# Patient Record
Sex: MALE | Race: WHITE | NOT HISPANIC OR LATINO
[De-identification: names, ages, dates, MRNs, and addresses within clinical notes are randomized per-mention and may not be internally consistent; named-entity substitution may affect disease eponyms.]

---

## 2017-01-23 ENCOUNTER — MESSAGE (OUTPATIENT)
Age: 37
End: 2017-01-23

## 2017-02-24 ENCOUNTER — MEDICATION RENEWAL (OUTPATIENT)
Age: 37
End: 2017-02-24

## 2017-03-21 ENCOUNTER — MEDICATION RENEWAL (OUTPATIENT)
Age: 37
End: 2017-03-21

## 2017-06-19 ENCOUNTER — MEDICATION RENEWAL (OUTPATIENT)
Age: 37
End: 2017-06-19

## 2017-07-20 ENCOUNTER — MEDICATION RENEWAL (OUTPATIENT)
Age: 37
End: 2017-07-20

## 2017-08-18 ENCOUNTER — RX RENEWAL (OUTPATIENT)
Age: 37
End: 2017-08-18

## 2017-08-24 ENCOUNTER — APPOINTMENT (OUTPATIENT)
Dept: HUMAN REPRODUCTION | Facility: CLINIC | Age: 37
End: 2017-08-24
Payer: COMMERCIAL

## 2017-08-24 PROCEDURE — 89322 SEMEN ANAL STRICT CRITERIA: CPT

## 2017-10-02 ENCOUNTER — MEDICATION RENEWAL (OUTPATIENT)
Age: 37
End: 2017-10-02

## 2017-11-02 ENCOUNTER — MEDICATION RENEWAL (OUTPATIENT)
Age: 37
End: 2017-11-02

## 2017-11-30 ENCOUNTER — MEDICATION RENEWAL (OUTPATIENT)
Age: 37
End: 2017-11-30

## 2018-01-02 ENCOUNTER — MEDICATION RENEWAL (OUTPATIENT)
Age: 38
End: 2018-01-02

## 2018-01-31 ENCOUNTER — MEDICATION RENEWAL (OUTPATIENT)
Age: 38
End: 2018-01-31

## 2018-03-01 ENCOUNTER — MEDICATION RENEWAL (OUTPATIENT)
Age: 38
End: 2018-03-01

## 2018-03-28 ENCOUNTER — MEDICATION RENEWAL (OUTPATIENT)
Age: 38
End: 2018-03-28

## 2018-03-29 ENCOUNTER — MEDICATION RENEWAL (OUTPATIENT)
Age: 38
End: 2018-03-29

## 2018-04-30 ENCOUNTER — MEDICATION RENEWAL (OUTPATIENT)
Age: 38
End: 2018-04-30

## 2018-05-14 ENCOUNTER — APPOINTMENT (OUTPATIENT)
Dept: INTERNAL MEDICINE | Facility: CLINIC | Age: 38
End: 2018-05-14
Payer: COMMERCIAL

## 2018-05-14 VITALS — WEIGHT: 231 LBS | BODY MASS INDEX: 30.62 KG/M2 | HEIGHT: 73 IN

## 2018-05-14 DIAGNOSIS — Z87.09 PERSONAL HISTORY OF OTHER DISEASES OF THE RESPIRATORY SYSTEM: ICD-10-CM

## 2018-05-14 LAB
BILIRUB UR QL STRIP: NORMAL
CLARITY UR: CLEAR
COLLECTION METHOD: NORMAL
GLUCOSE UR-MCNC: NORMAL
HCG UR QL: 0.2 EU/DL
HGB UR QL STRIP.AUTO: NORMAL
KETONES UR-MCNC: NORMAL
LEUKOCYTE ESTERASE UR QL STRIP: NORMAL
NITRITE UR QL STRIP: NORMAL
PH UR STRIP: 6
PROT UR STRIP-MCNC: NORMAL
SP GR UR STRIP: >=1.03

## 2018-05-14 PROCEDURE — 94010 BREATHING CAPACITY TEST: CPT

## 2018-05-14 PROCEDURE — 81003 URINALYSIS AUTO W/O SCOPE: CPT | Mod: QW

## 2018-05-14 RX ORDER — OSELTAMIVIR PHOSPHATE 75 MG/1
75 CAPSULE ORAL
Qty: 10 | Refills: 0 | Status: DISCONTINUED | COMMUNITY
Start: 2018-01-21

## 2018-05-14 RX ORDER — AMOXICILLIN 250 MG/1
250 CAPSULE ORAL
Qty: 21 | Refills: 0 | Status: DISCONTINUED | COMMUNITY
Start: 2018-04-05

## 2018-05-14 RX ORDER — CHLORHEXIDINE GLUCONATE, 0.12% ORAL RINSE 1.2 MG/ML
0.12 SOLUTION DENTAL
Qty: 473 | Refills: 0 | Status: DISCONTINUED | COMMUNITY
Start: 2018-04-05

## 2018-05-31 ENCOUNTER — MEDICATION RENEWAL (OUTPATIENT)
Age: 38
End: 2018-05-31

## 2018-06-08 ENCOUNTER — EMERGENCY (EMERGENCY)
Facility: HOSPITAL | Age: 38
LOS: 1 days | Discharge: HOME | End: 2018-06-08
Attending: EMERGENCY MEDICINE | Admitting: EMERGENCY MEDICINE
Payer: COMMERCIAL

## 2018-06-08 VITALS
SYSTOLIC BLOOD PRESSURE: 113 MMHG | HEART RATE: 66 BPM | OXYGEN SATURATION: 100 % | RESPIRATION RATE: 16 BRPM | DIASTOLIC BLOOD PRESSURE: 70 MMHG

## 2018-06-08 VITALS
DIASTOLIC BLOOD PRESSURE: 68 MMHG | OXYGEN SATURATION: 98 % | TEMPERATURE: 98 F | SYSTOLIC BLOOD PRESSURE: 96 MMHG | RESPIRATION RATE: 20 BRPM | HEART RATE: 68 BPM

## 2018-06-08 LAB
ALBUMIN SERPL ELPH-MCNC: 4.1 G/DL — SIGNIFICANT CHANGE UP (ref 3.3–5)
ALP SERPL-CCNC: 76 U/L — SIGNIFICANT CHANGE UP (ref 40–120)
ALT FLD-CCNC: 21 U/L — SIGNIFICANT CHANGE UP (ref 4–41)
AST SERPL-CCNC: 15 U/L — SIGNIFICANT CHANGE UP (ref 4–40)
BASOPHILS # BLD AUTO: 0.03 K/UL — SIGNIFICANT CHANGE UP (ref 0–0.2)
BASOPHILS NFR BLD AUTO: 0.3 % — SIGNIFICANT CHANGE UP (ref 0–2)
BILIRUB SERPL-MCNC: 0.2 MG/DL — SIGNIFICANT CHANGE UP (ref 0.2–1.2)
BUN SERPL-MCNC: 9 MG/DL — SIGNIFICANT CHANGE UP (ref 7–23)
CALCIUM SERPL-MCNC: 8.6 MG/DL — SIGNIFICANT CHANGE UP (ref 8.4–10.5)
CHLORIDE SERPL-SCNC: 103 MMOL/L — SIGNIFICANT CHANGE UP (ref 98–107)
CO2 SERPL-SCNC: 25 MMOL/L — SIGNIFICANT CHANGE UP (ref 22–31)
CREAT SERPL-MCNC: 0.96 MG/DL — SIGNIFICANT CHANGE UP (ref 0.5–1.3)
D DIMER BLD IA.RAPID-MCNC: < 150 NG/ML — SIGNIFICANT CHANGE UP
EOSINOPHIL # BLD AUTO: 0.18 K/UL — SIGNIFICANT CHANGE UP (ref 0–0.5)
EOSINOPHIL NFR BLD AUTO: 1.8 % — SIGNIFICANT CHANGE UP (ref 0–6)
GLUCOSE SERPL-MCNC: 106 MG/DL — HIGH (ref 70–99)
HCT VFR BLD CALC: 40.3 % — SIGNIFICANT CHANGE UP (ref 39–50)
HGB BLD-MCNC: 13.9 G/DL — SIGNIFICANT CHANGE UP (ref 13–17)
IMM GRANULOCYTES # BLD AUTO: 0.03 # — SIGNIFICANT CHANGE UP
IMM GRANULOCYTES NFR BLD AUTO: 0.3 % — SIGNIFICANT CHANGE UP (ref 0–1.5)
LYMPHOCYTES # BLD AUTO: 1.94 K/UL — SIGNIFICANT CHANGE UP (ref 1–3.3)
LYMPHOCYTES # BLD AUTO: 18.9 % — SIGNIFICANT CHANGE UP (ref 13–44)
MCHC RBC-ENTMCNC: 28.2 PG — SIGNIFICANT CHANGE UP (ref 27–34)
MCHC RBC-ENTMCNC: 34.5 % — SIGNIFICANT CHANGE UP (ref 32–36)
MCV RBC AUTO: 81.7 FL — SIGNIFICANT CHANGE UP (ref 80–100)
MONOCYTES # BLD AUTO: 0.62 K/UL — SIGNIFICANT CHANGE UP (ref 0–0.9)
MONOCYTES NFR BLD AUTO: 6 % — SIGNIFICANT CHANGE UP (ref 2–14)
NEUTROPHILS # BLD AUTO: 7.46 K/UL — HIGH (ref 1.8–7.4)
NEUTROPHILS NFR BLD AUTO: 72.7 % — SIGNIFICANT CHANGE UP (ref 43–77)
NRBC # FLD: 0 — SIGNIFICANT CHANGE UP
PLATELET # BLD AUTO: 251 K/UL — SIGNIFICANT CHANGE UP (ref 150–400)
PMV BLD: 10.2 FL — SIGNIFICANT CHANGE UP (ref 7–13)
POTASSIUM SERPL-MCNC: 3.8 MMOL/L — SIGNIFICANT CHANGE UP (ref 3.5–5.3)
POTASSIUM SERPL-SCNC: 3.8 MMOL/L — SIGNIFICANT CHANGE UP (ref 3.5–5.3)
PROT SERPL-MCNC: 6.7 G/DL — SIGNIFICANT CHANGE UP (ref 6–8.3)
RBC # BLD: 4.93 M/UL — SIGNIFICANT CHANGE UP (ref 4.2–5.8)
RBC # FLD: 12.8 % — SIGNIFICANT CHANGE UP (ref 10.3–14.5)
SODIUM SERPL-SCNC: 141 MMOL/L — SIGNIFICANT CHANGE UP (ref 135–145)
WBC # BLD: 10.26 K/UL — SIGNIFICANT CHANGE UP (ref 3.8–10.5)
WBC # FLD AUTO: 10.26 K/UL — SIGNIFICANT CHANGE UP (ref 3.8–10.5)

## 2018-06-08 PROCEDURE — 71046 X-RAY EXAM CHEST 2 VIEWS: CPT | Mod: 26

## 2018-06-08 PROCEDURE — 93010 ELECTROCARDIOGRAM REPORT: CPT

## 2018-06-08 PROCEDURE — 99284 EMERGENCY DEPT VISIT MOD MDM: CPT | Mod: 25

## 2018-06-08 NOTE — ED ADULT TRIAGE NOTE - CHIEF COMPLAINT QUOTE
pt woke up with right sided chest pain radiating to shoulder and back which lasted for about an hour. was feeling nauseous.  Pt says he had abdominal pain earlier. Denies any pain or nausea now.

## 2018-06-08 NOTE — ED PROVIDER NOTE - MEDICAL DECISION MAKING DETAILS
see attg note 38M PMH ADHD on ritalin p/w RUQ pain that radiated to R chest and RUE, lasted 45min, minimal nausea, no other associated symptoms, now completely resolved and asymptomatic. Slightly hypotensive (though no lightheaded or other cardiovascular symptoms, unknown baseline BP). Other vitals wnl. Exam as above. EKG wnl.  ddx: Possible gallstone vs. GERD/gastritis/PUD vs. MSK. clinically not ACS, tamponade, dissection, PTX, perf, myocarditis, mediastinitis.   Pt's father is MD.   Extensive discussion w/ pt and father. Though very low suspicion for PE and currently asymptomatic, will check d-dimer, CBC, cmp, XR. Reassess.  offered RUQ US, pt/family declined (clinically not cholecystitis).

## 2018-06-08 NOTE — ED PROVIDER NOTE - OBJECTIVE STATEMENT
38M PMH ADHD on ritalin p/w abd pain/CP. Awoke in middle of night to use bathroom, then developed RLQ pain, pain then migrated to RUQ w/ radiation to R chest and shoulder. Minimal nausea. Lasted ~45min then resolved and now completely asymptomatic. Felt slight SOB during episode. Not similar to prior. Denies VD, lightheaded, diaphoresis, palpitations, cough/rhinorrhea, black/bloody stool, LE pain/swelling, focal weakness/numbness, recent travel/immobilization, abd pain, urinary complaints, f/c.  No FMH CAD/clots. No prior cardiac w/u. 38M PMH ADHD on ritalin p/w abd pain/CP. Awoke in middle of night to use bathroom, then developed RLQ pain, pain then migrated to RUQ w/ radiation to R chest and shoulder. Minimal nausea. Lasted ~45min then resolved and now completely asymptomatic. Felt slight SOB during episode. Not similar to prior. Denies VD, lightheaded, diaphoresis, palpitations, cough/rhinorrhea, black/bloody stool, LE pain/swelling, focal weakness/numbness, recent travel/immobilization, abd pain, urinary complaints, f/c.  Pt adopted, unknown FMH. No prior cardiac w/u.

## 2018-06-08 NOTE — ED PROVIDER NOTE - PROGRESS NOTE DETAILS
[place]: Remains asymptomatic, labs/xr grossly wnl, given copy of results, comfortable for dc, outpt pmd f/u.

## 2018-06-08 NOTE — ED ADULT NURSE NOTE - OBJECTIVE STATEMENT
Pt received to rm 15 with family at bedside, Calm pleasant Jovial affect Pt st" Around 3:30am I got up to use the bathroom and had a sharp pain in rt mid back area that I never felt before....and I felt like I couldn't take deep breath....lasted apprx 20 minutes....now it is completely gone." Denies nausea, Denies urinary symptoms. No Med hx. EKG done, pt place on cm, MD Roscoe hall at beside  and labs sent. Pt positoned for comfort. xray pending.

## 2018-06-08 NOTE — ED PROVIDER NOTE - ATTENDING CONTRIBUTION TO CARE
38M PMH ADHD on ritalin p/w RUQ pain that radiated to R chest and RUE, lasted 45min, minimal nausea, no other associated symptoms, now completely resolved and asymptomatic. Slightly hypotensive (though no lightheaded or other cardiovascular symptoms, unknown baseline BP). Other vitals wnl. Exam as above. EKG wnl.  ddx: Possible gallstone vs. GERD/gastritis/PUD vs. MSK. clinically not ACS, tamponade, dissection, PTX, perf, myocarditis, mediastinitis.   Pt's father is MD.   Extensive discussion w/ pt and father. Though very low suspicion for PE and currently asymptomatic, will check d-dimer, CBC, cmp, XR. Reassess. 38M PMH ADHD on ritalin p/w RUQ pain that radiated to R chest and RUE, lasted 45min, minimal nausea, no other associated symptoms, now completely resolved and asymptomatic. Slightly hypotensive (though no lightheaded or other cardiovascular symptoms, unknown baseline BP). Other vitals wnl. Exam as above. EKG wnl.  ddx: Possible gallstone vs. GERD/gastritis/PUD vs. MSK. clinically not ACS, tamponade, dissection, PTX, perf, myocarditis, mediastinitis.   Pt's father is MD.   Extensive discussion w/ pt and father. Though very low suspicion for PE and currently asymptomatic, will check d-dimer, CBC, cmp, XR. Reassess.  offered RUQ US, pt/family declined (clinically not cholecystitis).

## 2018-06-29 ENCOUNTER — MEDICATION RENEWAL (OUTPATIENT)
Age: 38
End: 2018-06-29

## 2018-07-03 ENCOUNTER — APPOINTMENT (OUTPATIENT)
Dept: INFECTIOUS DISEASE | Facility: CLINIC | Age: 38
End: 2018-07-03

## 2018-07-15 ENCOUNTER — APPOINTMENT (OUTPATIENT)
Dept: HUMAN REPRODUCTION | Facility: CLINIC | Age: 38
End: 2018-07-15
Payer: COMMERCIAL

## 2018-07-15 PROCEDURE — 89343 STORAGE/YEAR SPERM/SEMEN: CPT

## 2018-07-15 PROCEDURE — 89259 CRYOPRESERVATION SPERM: CPT

## 2018-07-16 ENCOUNTER — APPOINTMENT (OUTPATIENT)
Dept: INTERNAL MEDICINE | Facility: CLINIC | Age: 38
End: 2018-07-16
Payer: COMMERCIAL

## 2018-07-16 PROCEDURE — 90715 TDAP VACCINE 7 YRS/> IM: CPT

## 2018-07-16 PROCEDURE — 90471 IMMUNIZATION ADMIN: CPT

## 2018-07-19 ENCOUNTER — RX RENEWAL (OUTPATIENT)
Age: 38
End: 2018-07-19

## 2018-07-27 ENCOUNTER — MEDICATION RENEWAL (OUTPATIENT)
Age: 38
End: 2018-07-27

## 2018-07-31 DIAGNOSIS — T78.40XA ALLERGY, UNSPECIFIED, INITIAL ENCOUNTER: ICD-10-CM

## 2018-09-24 ENCOUNTER — MEDICATION RENEWAL (OUTPATIENT)
Age: 38
End: 2018-09-24

## 2018-10-22 ENCOUNTER — MEDICATION RENEWAL (OUTPATIENT)
Age: 38
End: 2018-10-22

## 2018-11-19 ENCOUNTER — MEDICATION RENEWAL (OUTPATIENT)
Age: 38
End: 2018-11-19

## 2018-12-18 ENCOUNTER — MEDICATION RENEWAL (OUTPATIENT)
Age: 38
End: 2018-12-18

## 2018-12-31 ENCOUNTER — APPOINTMENT (OUTPATIENT)
Dept: INTERNAL MEDICINE | Facility: CLINIC | Age: 38
End: 2018-12-31
Payer: COMMERCIAL

## 2018-12-31 PROCEDURE — 90674 CCIIV4 VAC NO PRSV 0.5 ML IM: CPT

## 2018-12-31 PROCEDURE — G0008: CPT

## 2019-01-03 ENCOUNTER — LABORATORY RESULT (OUTPATIENT)
Age: 39
End: 2019-01-03

## 2019-01-04 ENCOUNTER — APPOINTMENT (OUTPATIENT)
Dept: INTERNAL MEDICINE | Facility: CLINIC | Age: 39
End: 2019-01-04
Payer: COMMERCIAL

## 2019-01-04 ENCOUNTER — NON-APPOINTMENT (OUTPATIENT)
Age: 39
End: 2019-01-04

## 2019-01-04 VITALS — DIASTOLIC BLOOD PRESSURE: 70 MMHG | SYSTOLIC BLOOD PRESSURE: 120 MMHG

## 2019-01-04 VITALS — BODY MASS INDEX: 31.28 KG/M2 | HEIGHT: 73 IN | WEIGHT: 236 LBS

## 2019-01-04 DIAGNOSIS — Z00.00 ENCOUNTER FOR GENERAL ADULT MEDICAL EXAMINATION W/OUT ABNORMAL FINDINGS: ICD-10-CM

## 2019-01-04 LAB
BILIRUB UR QL STRIP: NORMAL
CLARITY UR: CLEAR
COLLECTION METHOD: NORMAL
GLUCOSE UR-MCNC: NORMAL
HCG UR QL: 0.2 EU/DL
HGB UR QL STRIP.AUTO: NORMAL
KETONES UR-MCNC: NORMAL
LEUKOCYTE ESTERASE UR QL STRIP: NORMAL
NITRITE UR QL STRIP: NORMAL
PH UR STRIP: 7
PROT UR STRIP-MCNC: NORMAL
SP GR UR STRIP: 1.02

## 2019-01-04 PROCEDURE — 81003 URINALYSIS AUTO W/O SCOPE: CPT | Mod: QW

## 2019-01-04 PROCEDURE — 93000 ELECTROCARDIOGRAM COMPLETE: CPT

## 2019-01-04 PROCEDURE — 36415 COLL VENOUS BLD VENIPUNCTURE: CPT

## 2019-01-04 PROCEDURE — 99395 PREV VISIT EST AGE 18-39: CPT | Mod: 25

## 2019-01-04 RX ORDER — AZITHROMYCIN 250 MG/1
250 TABLET, FILM COATED ORAL
Qty: 1 | Refills: 0 | Status: DISCONTINUED | COMMUNITY
Start: 2018-05-14 | End: 2019-01-04

## 2019-01-04 NOTE — PHYSICAL EXAM
[Normal Sclera/Conjunctiva] : normal sclera/conjunctiva [PERRL] : pupils equal round and reactive to light [Normal Outer Ear/Nose] : the outer ears and nose were normal in appearance [Normal Oropharynx] : the oropharynx was normal [Normal TMs] : both tympanic membranes were normal [No JVD] : no jugular venous distention [Supple] : supple [No Lymphadenopathy] : no lymphadenopathy [No Respiratory Distress] : no respiratory distress  [Clear to Auscultation] : lungs were clear to auscultation bilaterally [Normal Rate] : normal rate  [Regular Rhythm] : with a regular rhythm [Normal S1, S2] : normal S1 and S2 [No Carotid Bruits] : no carotid bruits [Pedal Pulses Present] : the pedal pulses are present [Soft] : abdomen soft [No HSM] : no HSM [Declined Rectal Exam] : declined rectal exam [No Skin Lesions] : no skin lesions [Normal Mood] : the mood was normal

## 2019-01-04 NOTE — HEALTH RISK ASSESSMENT
[No falls in past year] : Patient reported no falls in the past year [0] : 2) Feeling down, depressed, or hopeless: Not at all (0) [Fully functional (bathing, dressing, toileting, transferring, walking, feeding)] : Fully functional (bathing, dressing, toileting, transferring, walking, feeding) [Fully functional (using the telephone, shopping, preparing meals, housekeeping, doing laundry, using] : Fully functional and needs no help or supervision to perform IADLs (using the telephone, shopping, preparing meals, housekeeping, doing laundry, using transportation, managing medications and managing finances) [Patient declined discussion] : Patient declined discussion [] : No [[id number]] : 0

## 2019-01-04 NOTE — ASSESSMENT
[FreeTextEntry1] : The patient with oral signs were stable. His complete physical examination was normal except for being overweight. His EKG was normal. I placed the patient on a low carb diet and we spoke about importance of daily exercise. I advised the patient to return in 3 months to assess his progress

## 2019-01-04 NOTE — HISTORY OF PRESENT ILLNESS
[de-identified] : This is a 39-year-old gentleman who is here today for his annual examination. He has a history of ADD for which she's on medications. And occasionally has seasonal allergies. He denies any complaints except that he's gained significant amount of weight over the past 2 years.

## 2019-01-05 ENCOUNTER — CLINICAL ADVICE (OUTPATIENT)
Age: 39
End: 2019-01-05

## 2019-01-05 LAB
25(OH)D3 SERPL-MCNC: 17.2 NG/ML
ALBUMIN SERPL ELPH-MCNC: 4.6 G/DL
ALP BLD-CCNC: 90 U/L
ALT SERPL-CCNC: 34 U/L
ANION GAP SERPL CALC-SCNC: 14 MMOL/L
AST SERPL-CCNC: 22 U/L
BASOPHILS # BLD AUTO: 0.02 K/UL
BASOPHILS NFR BLD AUTO: 0.3 %
BILIRUB SERPL-MCNC: 0.2 MG/DL
BUN SERPL-MCNC: 18 MG/DL
CALCIUM SERPL-MCNC: 10 MG/DL
CHLORIDE SERPL-SCNC: 104 MMOL/L
CHOLEST SERPL-MCNC: 216 MG/DL
CHOLEST/HDLC SERPL: 4.9 RATIO
CO2 SERPL-SCNC: 26 MMOL/L
CREAT SERPL-MCNC: 0.98 MG/DL
EOSINOPHIL # BLD AUTO: 0.14 K/UL
EOSINOPHIL NFR BLD AUTO: 1.9 %
FERRITIN SERPL-MCNC: 219 NG/ML
GLUCOSE SERPL-MCNC: 86 MG/DL
HBA1C MFR BLD HPLC: 5.7 %
HCT VFR BLD CALC: 45.7 %
HDLC SERPL-MCNC: 44 MG/DL
HGB BLD-MCNC: 14.8 G/DL
IMM GRANULOCYTES NFR BLD AUTO: 0.1 %
LDLC SERPL CALC-MCNC: 123 MG/DL
LYMPHOCYTES # BLD AUTO: 2.36 K/UL
LYMPHOCYTES NFR BLD AUTO: 31.8 %
MAN DIFF?: NORMAL
MCHC RBC-ENTMCNC: 28.8 PG
MCHC RBC-ENTMCNC: 32.4 GM/DL
MCV RBC AUTO: 88.9 FL
MONOCYTES # BLD AUTO: 0.66 K/UL
MONOCYTES NFR BLD AUTO: 8.9 %
NEUTROPHILS # BLD AUTO: 4.22 K/UL
NEUTROPHILS NFR BLD AUTO: 57 %
PLATELET # BLD AUTO: 269 K/UL
POTASSIUM SERPL-SCNC: 4.3 MMOL/L
PROT SERPL-MCNC: 7.7 G/DL
RBC # BLD: 5.14 M/UL
RBC # FLD: 13.6 %
SAVE SPECIMEN: NORMAL
SODIUM SERPL-SCNC: 144 MMOL/L
T3RU NFR SERPL: 1.07 INDEX
T4 SERPL-MCNC: 6.1 UG/DL
TRIGL SERPL-MCNC: 247 MG/DL
TSH SERPL-ACNC: 1.92 UIU/ML
URATE SERPL-MCNC: 5.9 MG/DL
VIT B12 SERPL-MCNC: 400 PG/ML
WBC # FLD AUTO: 7.41 K/UL

## 2019-02-03 PROBLEM — T78.40XA ALLERGY: Status: ACTIVE | Noted: 2018-08-06

## 2019-02-12 ENCOUNTER — MEDICATION RENEWAL (OUTPATIENT)
Age: 39
End: 2019-02-12

## 2019-03-12 ENCOUNTER — MEDICATION RENEWAL (OUTPATIENT)
Age: 39
End: 2019-03-12

## 2019-04-11 ENCOUNTER — MEDICATION RENEWAL (OUTPATIENT)
Age: 39
End: 2019-04-11

## 2019-05-20 ENCOUNTER — MEDICATION RENEWAL (OUTPATIENT)
Age: 39
End: 2019-05-20

## 2019-06-18 ENCOUNTER — MEDICATION RENEWAL (OUTPATIENT)
Age: 39
End: 2019-06-18

## 2019-07-23 ENCOUNTER — MEDICATION RENEWAL (OUTPATIENT)
Age: 39
End: 2019-07-23

## 2019-09-04 ENCOUNTER — MEDICATION RENEWAL (OUTPATIENT)
Age: 39
End: 2019-09-04

## 2019-10-07 ENCOUNTER — MEDICATION RENEWAL (OUTPATIENT)
Age: 39
End: 2019-10-07

## 2019-10-28 ENCOUNTER — APPOINTMENT (OUTPATIENT)
Dept: INTERNAL MEDICINE | Facility: CLINIC | Age: 39
End: 2019-10-28
Payer: COMMERCIAL

## 2019-10-28 VITALS — SYSTOLIC BLOOD PRESSURE: 120 MMHG | DIASTOLIC BLOOD PRESSURE: 70 MMHG

## 2019-10-28 VITALS — TEMPERATURE: 98.1 F

## 2019-10-28 DIAGNOSIS — Z87.09 PERSONAL HISTORY OF OTHER DISEASES OF THE RESPIRATORY SYSTEM: ICD-10-CM

## 2019-10-28 LAB — S PYO AG SPEC QL IA: NEGATIVE

## 2019-10-28 PROCEDURE — 87880 STREP A ASSAY W/OPTIC: CPT | Mod: QW

## 2019-10-28 PROCEDURE — 99213 OFFICE O/P EST LOW 20 MIN: CPT | Mod: 25

## 2019-10-28 NOTE — PHYSICAL EXAM
[Normal] : normal rate, regular rhythm, normal S1 and S2 and no murmur heard [de-identified] : Pharyngitis

## 2019-10-28 NOTE — HISTORY OF PRESENT ILLNESS
[de-identified] : This is a 39-year-old gentleman complaining of a sore throat with low-grade fever. He denies any other complaints

## 2019-10-28 NOTE — ASSESSMENT
[FreeTextEntry1] : The patient's physical examination is positive for a mild pharyngitis. History of screen was negative. I treated him symptomatically

## 2019-11-04 ENCOUNTER — MEDICATION RENEWAL (OUTPATIENT)
Age: 39
End: 2019-11-04

## 2019-12-05 ENCOUNTER — MEDICATION RENEWAL (OUTPATIENT)
Age: 39
End: 2019-12-05

## 2020-05-12 ENCOUNTER — APPOINTMENT (OUTPATIENT)
Dept: INTERNAL MEDICINE | Facility: CLINIC | Age: 40
End: 2020-05-12
Payer: COMMERCIAL

## 2020-05-12 PROCEDURE — 99213 OFFICE O/P EST LOW 20 MIN: CPT | Mod: 95

## 2020-05-12 NOTE — ASSESSMENT
[FreeTextEntry1] : The patient clearly has a phone call by the umbilicus. It has not come to a head yet and does not have to be incised and drained. I started the patient on doxycycline 100 mg b.i.d. for 10 days and also started him on Bactroban cream to be applied twice a day. I also enforced to the patient that he has to wash his hands many times to her today and must avoid touching his follicle. I advised him as her risk of contagiousness and advised to use separate towels in separate soaps. I also told him to avoid contact with his previous that he has and also that I want to see it again in a few days because sure that this is going to have to be incised and drained

## 2020-05-12 NOTE — HISTORY OF PRESENT ILLNESS
[Home] : at home, [unfilled] , at the time of the visit. [de-identified] : This is a 32-year-old gentleman with a history of asthma who is complaining of a lump by his umbilicus. He complains of itchy. He denies any fever chills or any other rashes [Medical Office: ([facility])___] : at the medical office located in

## 2020-05-14 ENCOUNTER — APPOINTMENT (OUTPATIENT)
Dept: INTERNAL MEDICINE | Facility: CLINIC | Age: 40
End: 2020-05-14
Payer: COMMERCIAL

## 2020-05-14 DIAGNOSIS — F98.8 OTHER SPECIFIED BEHAVIORAL AND EMOTIONAL DISORDERS WITH ONSET USUALLY OCCURRING IN CHILDHOOD AND ADOLESCENCE: ICD-10-CM

## 2020-05-14 PROCEDURE — 99213 OFFICE O/P EST LOW 20 MIN: CPT | Mod: 95

## 2020-05-14 NOTE — ASSESSMENT
[FreeTextEntry1] : On examination of the furuncle of did open and there is no pus present is a mild erythema at the site. I advised him to continue his antibiotics and Bactroban. And I had a long talk with him about avoiding scratching his abdomen keeping his hands away from his face and nose. And to make sure that he scrupulously washes his hands and sensitizers him as he has to 's at home

## 2020-05-14 NOTE — HISTORY OF PRESENT ILLNESS
[de-identified] : This is a patient with a history of ADHD who developed a furuncle on his abdomen. I treated him with doxycycline 100 mg b.i.d. and also with Bactroban cream t.i.d. The patient is here for a followup visit but he claims that the furuncle opened and drained spontaneously and occasionally better

## 2020-05-15 LAB
SARS-COV-2 IGG SERPL IA-ACNC: <0.1 INDEX
SARS-COV-2 IGG SERPL QL IA: NEGATIVE

## 2020-08-10 RX ORDER — DOXYCYCLINE HYCLATE 100 MG/1
100 CAPSULE ORAL
Qty: 20 | Refills: 0 | Status: DISCONTINUED | COMMUNITY
Start: 2020-05-12 | End: 2020-08-10

## 2020-09-04 ENCOUNTER — TRANSCRIPTION ENCOUNTER (OUTPATIENT)
Age: 40
End: 2020-09-04

## 2020-09-24 DIAGNOSIS — L03.019 CELLULITIS OF UNSPECIFIED FINGER: ICD-10-CM

## 2020-11-11 ENCOUNTER — APPOINTMENT (OUTPATIENT)
Dept: HUMAN REPRODUCTION | Facility: CLINIC | Age: 40
End: 2020-11-11

## 2020-12-21 PROBLEM — Z87.09 HISTORY OF ACUTE PHARYNGITIS: Status: RESOLVED | Noted: 2019-10-28 | Resolved: 2020-12-21

## 2021-03-22 ENCOUNTER — APPOINTMENT (OUTPATIENT)
Dept: PULMONOLOGY | Facility: CLINIC | Age: 41
End: 2021-03-22
Payer: COMMERCIAL

## 2021-03-22 ENCOUNTER — APPOINTMENT (OUTPATIENT)
Dept: INTERNAL MEDICINE | Facility: CLINIC | Age: 41
End: 2021-03-22
Payer: COMMERCIAL

## 2021-03-22 VITALS — HEIGHT: 72 IN | WEIGHT: 250 LBS | HEART RATE: 96 BPM | BODY MASS INDEX: 33.86 KG/M2 | TEMPERATURE: 97.6 F

## 2021-03-22 PROCEDURE — 99443: CPT

## 2021-03-22 PROCEDURE — 99203 OFFICE O/P NEW LOW 30 MIN: CPT | Mod: 95

## 2021-03-22 RX ORDER — CEPHALEXIN 500 MG/1
500 CAPSULE ORAL
Qty: 28 | Refills: 1 | Status: DISCONTINUED | COMMUNITY
Start: 2020-09-24 | End: 2021-03-22

## 2021-03-22 NOTE — HISTORY OF PRESENT ILLNESS
[Home] : at home, [unfilled] , at the time of the visit. [Medical Office: ([facility])___] : at the medical office located in  [Verbal consent obtained from patient] : the patient, [unfilled] [FreeTextEntry1] : 41-year-old male, initial telehealth visit, referred to the [facility] program for Covid.\par \par Patient received his second dose of the Moderna vaccine on 17th.  On that same day he was exposed to Covid.  His wife (who is 1 month post her second dose of vaccine) and younger children age 1 and 2, were exposed as well.\par \par On March 20 he developed mild symptoms of nasal congestion.  In fact, this is similar to his typical allergy symptoms this time of year.  Because of the exposure however he was tested for Covid, along with rest of his family.  His wife and children tested negative, he tested positive on rapid.  PCR pending.  He has been isolating from his family since.\par \par His symptoms are minimal.  Mild nasal congestion otherwise completely asymptomatic.  He has a pulse ox which is 97 to 99% on room air\par \par His only past medical history is obesity.  Currently 6 feet 1 inches, 250 pounds for BMI of 33.\par Takes no medications\par \par On exam on video he appears well no distress does not even appear ill.\par \par Healthy 41-year-old male, Covid day 3 and 5 days post his second dose of vaccine.\par Symptoms are mild, and I anticipate there will continue to be given the fact that he is already post both doses of vaccine.  No other intervention is needed at this time, he will continue to monitor his pulse oximeter\par He will complete 10 days of isolation

## 2021-03-22 NOTE — ASSESSMENT
[FreeTextEntry1] : This is a patient who is 41 years old in good health aside from his weight which is 250 pounds and his BMI which is 33 who recently completed his course of vaccinations and then was exposed to Covid.  He does not express any symptoms related to Covid.  He is not short of breath nor does he have any fever.  A Covid PCR was performed this a.m. and my suspicion is that he will have a modicum of symptoms even if he is positive.  I advised the patient to check his pulse oximeter to call me if any change in his symptoms especially shortness of breath or fever.

## 2021-03-22 NOTE — HISTORY OF PRESENT ILLNESS
[Home] : at home, [unfilled] , at the time of the visit. [Medical Office: ([facility])___] : at the medical office located in  [Verbal consent obtained from patient] : the patient, [unfilled] [de-identified] : This is a patient in good health aside from occasional asthma and obesity class I his BMI is 33 who recently was vaccinated to Covid and subsequent to his second vaccination was exposed to patient who had Covid.  His rapid Covid was positive Covid PCR was performed this morning

## 2021-03-23 LAB — SARS-COV-2 N GENE NPH QL NAA+PROBE: DETECTED

## 2021-09-03 ENCOUNTER — NON-APPOINTMENT (OUTPATIENT)
Age: 41
End: 2021-09-03

## 2021-09-07 ENCOUNTER — APPOINTMENT (OUTPATIENT)
Dept: OBGYN | Facility: CLINIC | Age: 41
End: 2021-09-07

## 2021-09-09 LAB — SARS-COV-2 N GENE NPH QL NAA+PROBE: NOT DETECTED

## 2021-11-29 ENCOUNTER — INPATIENT (INPATIENT)
Facility: HOSPITAL | Age: 41
LOS: 1 days | Discharge: HOME | End: 2021-12-01
Attending: GENERAL PRACTICE | Admitting: GENERAL PRACTICE
Payer: COMMERCIAL

## 2021-11-29 VITALS
OXYGEN SATURATION: 100 % | TEMPERATURE: 98 F | HEART RATE: 68 BPM | RESPIRATION RATE: 16 BRPM | SYSTOLIC BLOOD PRESSURE: 131 MMHG | DIASTOLIC BLOOD PRESSURE: 76 MMHG

## 2021-11-29 LAB
ALBUMIN SERPL ELPH-MCNC: 4.5 G/DL — SIGNIFICANT CHANGE UP (ref 3.3–5)
ALP SERPL-CCNC: 75 U/L — SIGNIFICANT CHANGE UP (ref 40–120)
ALT FLD-CCNC: 22 U/L — SIGNIFICANT CHANGE UP (ref 4–41)
ANION GAP SERPL CALC-SCNC: 14 MMOL/L — SIGNIFICANT CHANGE UP (ref 7–14)
APPEARANCE UR: SIGNIFICANT CHANGE UP
AST SERPL-CCNC: 17 U/L — SIGNIFICANT CHANGE UP (ref 4–40)
BACTERIA # UR AUTO: NEGATIVE — SIGNIFICANT CHANGE UP
BASOPHILS # BLD AUTO: 0.01 K/UL — SIGNIFICANT CHANGE UP (ref 0–0.2)
BASOPHILS NFR BLD AUTO: 0.1 % — SIGNIFICANT CHANGE UP (ref 0–2)
BILIRUB SERPL-MCNC: 0.6 MG/DL — SIGNIFICANT CHANGE UP (ref 0.2–1.2)
BILIRUB UR-MCNC: NEGATIVE — SIGNIFICANT CHANGE UP
BUN SERPL-MCNC: 12 MG/DL — SIGNIFICANT CHANGE UP (ref 7–23)
CALCIUM SERPL-MCNC: 9.2 MG/DL — SIGNIFICANT CHANGE UP (ref 8.4–10.5)
CHLORIDE SERPL-SCNC: 103 MMOL/L — SIGNIFICANT CHANGE UP (ref 98–107)
CO2 SERPL-SCNC: 25 MMOL/L — SIGNIFICANT CHANGE UP (ref 22–31)
COD CRY URNS QL: ABNORMAL
COLOR SPEC: YELLOW — SIGNIFICANT CHANGE UP
COMMENT - URINE: SIGNIFICANT CHANGE UP
COMMENT - URINE: SIGNIFICANT CHANGE UP
CREAT SERPL-MCNC: 0.93 MG/DL — SIGNIFICANT CHANGE UP (ref 0.5–1.3)
DIFF PNL FLD: NEGATIVE — SIGNIFICANT CHANGE UP
EOSINOPHIL # BLD AUTO: 0 K/UL — SIGNIFICANT CHANGE UP (ref 0–0.5)
EOSINOPHIL NFR BLD AUTO: 0 % — SIGNIFICANT CHANGE UP (ref 0–6)
EPI CELLS # UR: 1 /HPF — SIGNIFICANT CHANGE UP (ref 0–5)
GLUCOSE SERPL-MCNC: 114 MG/DL — HIGH (ref 70–99)
GLUCOSE UR QL: NEGATIVE — SIGNIFICANT CHANGE UP
HCT VFR BLD CALC: 42.8 % — SIGNIFICANT CHANGE UP (ref 39–50)
HGB BLD-MCNC: 14.5 G/DL — SIGNIFICANT CHANGE UP (ref 13–17)
HYALINE CASTS # UR AUTO: 1 /LPF — SIGNIFICANT CHANGE UP (ref 0–7)
IANC: 7.88 K/UL — SIGNIFICANT CHANGE UP (ref 1.5–8.5)
IMM GRANULOCYTES NFR BLD AUTO: 0.3 % — SIGNIFICANT CHANGE UP (ref 0–1.5)
KETONES UR-MCNC: ABNORMAL
LEUKOCYTE ESTERASE UR-ACNC: NEGATIVE — SIGNIFICANT CHANGE UP
LIDOCAIN IGE QN: 17 U/L — SIGNIFICANT CHANGE UP (ref 7–60)
LYMPHOCYTES # BLD AUTO: 1.11 K/UL — SIGNIFICANT CHANGE UP (ref 1–3.3)
LYMPHOCYTES # BLD AUTO: 11.7 % — LOW (ref 13–44)
MCHC RBC-ENTMCNC: 27.9 PG — SIGNIFICANT CHANGE UP (ref 27–34)
MCHC RBC-ENTMCNC: 33.9 GM/DL — SIGNIFICANT CHANGE UP (ref 32–36)
MCV RBC AUTO: 82.3 FL — SIGNIFICANT CHANGE UP (ref 80–100)
MONOCYTES # BLD AUTO: 0.47 K/UL — SIGNIFICANT CHANGE UP (ref 0–0.9)
MONOCYTES NFR BLD AUTO: 4.9 % — SIGNIFICANT CHANGE UP (ref 2–14)
NEUTROPHILS # BLD AUTO: 7.88 K/UL — HIGH (ref 1.8–7.4)
NEUTROPHILS NFR BLD AUTO: 83 % — HIGH (ref 43–77)
NITRITE UR-MCNC: NEGATIVE — SIGNIFICANT CHANGE UP
NRBC # BLD: 0 /100 WBCS — SIGNIFICANT CHANGE UP
NRBC # FLD: 0 K/UL — SIGNIFICANT CHANGE UP
PH UR: 6 — SIGNIFICANT CHANGE UP (ref 5–8)
PLATELET # BLD AUTO: 298 K/UL — SIGNIFICANT CHANGE UP (ref 150–400)
POTASSIUM SERPL-MCNC: 3.5 MMOL/L — SIGNIFICANT CHANGE UP (ref 3.5–5.3)
POTASSIUM SERPL-SCNC: 3.5 MMOL/L — SIGNIFICANT CHANGE UP (ref 3.5–5.3)
PROT SERPL-MCNC: 7.8 G/DL — SIGNIFICANT CHANGE UP (ref 6–8.3)
PROT UR-MCNC: ABNORMAL
RBC # BLD: 5.2 M/UL — SIGNIFICANT CHANGE UP (ref 4.2–5.8)
RBC # FLD: 12.8 % — SIGNIFICANT CHANGE UP (ref 10.3–14.5)
RBC CASTS # UR COMP ASSIST: 1 /HPF — SIGNIFICANT CHANGE UP (ref 0–4)
SARS-COV-2 RNA SPEC QL NAA+PROBE: SIGNIFICANT CHANGE UP
SODIUM SERPL-SCNC: 142 MMOL/L — SIGNIFICANT CHANGE UP (ref 135–145)
SP GR SPEC: 1.03 — SIGNIFICANT CHANGE UP (ref 1–1.05)
UROBILINOGEN FLD QL: SIGNIFICANT CHANGE UP
WBC # BLD: 9.5 K/UL — SIGNIFICANT CHANGE UP (ref 3.8–10.5)
WBC # FLD AUTO: 9.5 K/UL — SIGNIFICANT CHANGE UP (ref 3.8–10.5)
WBC UR QL: 3 /HPF — SIGNIFICANT CHANGE UP (ref 0–5)

## 2021-11-29 PROCEDURE — 99220: CPT

## 2021-11-29 PROCEDURE — 76705 ECHO EXAM OF ABDOMEN: CPT | Mod: 26

## 2021-11-29 PROCEDURE — 71045 X-RAY EXAM CHEST 1 VIEW: CPT | Mod: 26

## 2021-11-29 RX ORDER — SODIUM CHLORIDE 9 MG/ML
1000 INJECTION, SOLUTION INTRAVENOUS
Refills: 0 | Status: COMPLETED | OUTPATIENT
Start: 2021-11-29 | End: 2021-11-29

## 2021-11-29 RX ORDER — SODIUM,POTASSIUM PHOSPHATES 278-250MG
1 POWDER IN PACKET (EA) ORAL ONCE
Refills: 0 | Status: COMPLETED | OUTPATIENT
Start: 2021-11-29 | End: 2021-11-29

## 2021-11-29 RX ORDER — METOCLOPRAMIDE HCL 10 MG
10 TABLET ORAL ONCE
Refills: 0 | Status: COMPLETED | OUTPATIENT
Start: 2021-11-29 | End: 2021-11-29

## 2021-11-29 RX ORDER — SODIUM CHLORIDE 9 MG/ML
1000 INJECTION INTRAMUSCULAR; INTRAVENOUS; SUBCUTANEOUS ONCE
Refills: 0 | Status: COMPLETED | OUTPATIENT
Start: 2021-11-29 | End: 2021-11-29

## 2021-11-29 RX ORDER — METOCLOPRAMIDE HCL 10 MG
10 TABLET ORAL ONCE
Refills: 0 | Status: COMPLETED | OUTPATIENT
Start: 2021-11-29 | End: 2021-11-30

## 2021-11-29 RX ORDER — SODIUM CHLORIDE 9 MG/ML
1000 INJECTION INTRAMUSCULAR; INTRAVENOUS; SUBCUTANEOUS
Refills: 0 | Status: DISCONTINUED | OUTPATIENT
Start: 2021-11-29 | End: 2021-12-01

## 2021-11-29 RX ORDER — FAMOTIDINE 10 MG/ML
20 INJECTION INTRAVENOUS ONCE
Refills: 0 | Status: COMPLETED | OUTPATIENT
Start: 2021-11-29 | End: 2021-11-29

## 2021-11-29 RX ORDER — ONDANSETRON 8 MG/1
4 TABLET, FILM COATED ORAL ONCE
Refills: 0 | Status: COMPLETED | OUTPATIENT
Start: 2021-11-29 | End: 2021-11-29

## 2021-11-29 RX ADMIN — SODIUM CHLORIDE 125 MILLILITER(S): 9 INJECTION INTRAMUSCULAR; INTRAVENOUS; SUBCUTANEOUS at 21:15

## 2021-11-29 RX ADMIN — SODIUM CHLORIDE 150 MILLILITER(S): 9 INJECTION, SOLUTION INTRAVENOUS at 16:38

## 2021-11-29 RX ADMIN — SODIUM CHLORIDE 1000 MILLILITER(S): 9 INJECTION INTRAMUSCULAR; INTRAVENOUS; SUBCUTANEOUS at 10:53

## 2021-11-29 RX ADMIN — FAMOTIDINE 20 MILLIGRAM(S): 10 INJECTION INTRAVENOUS at 13:57

## 2021-11-29 RX ADMIN — Medication 10 MILLIGRAM(S): at 11:29

## 2021-11-29 RX ADMIN — Medication 1 PACKET(S): at 13:02

## 2021-11-29 RX ADMIN — ONDANSETRON 4 MILLIGRAM(S): 8 TABLET, FILM COATED ORAL at 13:58

## 2021-11-29 NOTE — ED PROVIDER NOTE - PHYSICAL EXAMINATION
CONSTITUTIONAL: NAD, awake, alert  HEAD: Normocephalic; atraumatic  ENMT: External appears normal, MMM  CARD: Normal Sl, S2; no audible murmurs  RESP: normal wob, lungs ctab  ABD: soft, non-distended; non-tender  MSK: no edema, normal ROM in all four extremities  SKIN: Warm, dry, no rashes  NEURO: aaox3, moving all extremities spontaneously, sensation intact and equal bilaterally, strength 5/5, no drift

## 2021-11-29 NOTE — ED PROVIDER NOTE - CLINICAL SUMMARY MEDICAL DECISION MAKING FREE TEXT BOX
41M w n/v x2 days, no abdominal pain or tenderness, no distension, no surgical history, no sick contacts, covid vaccinated, will check ekg for qtc as patient has been on zofran at home, labs, lipase, fluids, reassess, hold imaging for now

## 2021-11-29 NOTE — ED CDU PROVIDER INITIAL DAY NOTE - NS ED ROS FT
Constitutional:  (-) fever, (-) chills, (-) lethargy  Eyes:  (-) eye pain (-) visual changes  ENMT: (-) nasal discharge, (-) sore throat. (-) neck pain or stiffness  Cardiac: (-) chest pain (-) palpitations  Respiratory:  (-) cough (-) respiratory distress.   GI:  (+) nausea (+) vomiting (-) abdominal pain.  :  (-) dysuria (-) frequency (-) burning.  MS:  (-) back pain (-) joint pain.  Neuro:  (-) headache (+) paresthesias   Skin:  (-) rash

## 2021-11-29 NOTE — ED ADULT NURSE NOTE - HAVE YOU HAD A FIRST COVID-19 BOOSTER?
Dermatology Rooming Note    Koko Daniel's goals for this visit include:   Chief Complaint   Patient presents with     Derm Problem     Urticaria follow up, Ronel states she is doing much better, no concerns.     Elizabeth Kilpatrick LPN   Yes

## 2021-11-29 NOTE — ED PROVIDER NOTE - NS ED ROS FT
Constitutional:  (-) fever, (-) chills, (-) lethargy  ENMT: (-) nasal discharge, (-) sore throat, (-) neck pain or stiffness  Cardiac: (-) chest pain (-) palpitations  Respiratory:  (-) cough (-) SOB  GI:  (+) nausea (+) vomiting (-) diarrhea (-) abdominal pain  :  (-) dysuria (-) frequency (-) burning  MS:  (-) back pain (-) joint pain  Neuro:  (-) headache (+) tingling (-) focal weakness  Skin:  (-) rash

## 2021-11-29 NOTE — ED ADULT TRIAGE NOTE - CHIEF COMPLAINT QUOTE
Pt presents to ED ambulatory from home with c/o nausea and vomiting intermittently x 3 days. Pt reports he hasn't been able to keep anything down. Pt reports  no relief from Zofran.

## 2021-11-29 NOTE — CONSULT NOTE ADULT - SUBJECTIVE AND OBJECTIVE BOX
GENERAL SURGERY CONSULT NOTE    Patient is a 41y old male who presents with a chief complaint of nausea and vomiting     HPI: 41M with no significant past medical or surgical history presented with 2 days of nausea and vomiting. Patient also had mild and intermittent upper abdominal pain. He denies upper respiratory symptoms, diarrhea, fever or chills. No one else sick in the family. No travel history and no sick contact. Patient's symptoms have resolved when seen: denies abdominal pain and nausea. However, he couldn't tolerate PO challenge at noon. 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     10-points review of system performed with pertinent negative and positive findings documented in the HPI     PAST MEDICAL & SURGICAL HISTORY:  No pertinent past medical history  No significant past surgical history    FAMILY HISTORY:  No pertinent family history in first degree relatives    SOCIAL HISTORY: No pertinent social history    Allergies:   No Known Allergies    Vital Signs Last 24 Hrs  T(C): 37.1 (2021 17:50), Max: 37.2 (2021 10:36)  T(F): 98.7 (2021 17:50), Max: 98.9 (2021 10:36)  HR: 68 (2021 17:50) (66 - 77)  BP: 117/65 (2021 17:50) (117/65 - 138/79)  BP(mean): --  RR: 16 (2021 17:50) (16 - 16)  SpO2: 100% (2021 17:50) (100% - 100%)                          14.5   9.50  )-----------( 298      ( 2021 11:11 )             42.8         142  |  103  |  12  ----------------------------<  114<H>  3.5   |  25  |  0.93    Ca    9.2      2021 11:11  Phos  1.7       Mg     2.00         TPro  7.8  /  Alb  4.5  /  TBili  0.6  /  DBili  x   /  AST  17  /  ALT  22  /  AlkPhos  75      Urinalysis Basic - ( 2021 11:20 )  Color: Yellow / Appearance: Hazy / S.034 / pH: x  Gluc: x / Ketone: Moderate  / Bili: Negative / Urobili: <2 mg/dL   Blood: x / Protein: 30 mg/dL / Nitrite: Negative   Leuk Esterase: Negative / RBC: 1 /HPF / WBC 3 /HPF   Sq Epi: x / Non Sq Epi: 1 /HPF / Bacteria: Negative    IMAGING STUDIES:  EXAM:  US ABDOMEN RT UPR QUADRANT    PROCEDURE DATE:  2021     IMPRESSION:  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 Possibly trapped 8 mm gallstone at fundus. No findings to indicate acute cholecystitis at this time.  Moderate severity diffuse hepatic steatosis.         GENERAL SURGERY CONSULT NOTE    Patient is a 41y old male who presents with a chief complaint of nausea and vomiting     HPI: 41M with no significant past medical or surgical history presented with 2 days of nausea and vomiting. Patient also had mild and intermittent upper abdominal pain. He denies upper respiratory symptoms, diarrhea, fever or chills. He is passing gas. No one else sick in the family. No travel history and no sick contact. Patient's symptoms have resolved when seen: denies abdominal pain and nausea. However, he couldn't tolerate PO challenge at noon. 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     10-points review of system performed with pertinent negative and positive findings documented in the HPI     PAST MEDICAL & SURGICAL HISTORY:  No pertinent past medical history  No significant past surgical history    FAMILY HISTORY:  No pertinent family history in first degree relatives    SOCIAL HISTORY: No pertinent social history    Allergies:   No Known Allergies    Vital Signs Last 24 Hrs  T(C): 37.1 (2021 17:50), Max: 37.2 (2021 10:36)  T(F): 98.7 (2021 17:50), Max: 98.9 (2021 10:36)  HR: 68 (2021 17:50) (66 - 77)  BP: 117/65 (2021 17:50) (117/65 - 138/79)  BP(mean): --  RR: 16 (2021 17:50) (16 - 16)  SpO2: 100% (2021 17:50) (100% - 100%)                          14.5   9.50  )-----------( 298      ( 2021 11:11 )             42.8         142  |  103  |  12  ----------------------------<  114<H>  3.5   |  25  |  0.93    Ca    9.2      2021 11:11  Phos  1.7       Mg     2.00         TPro  7.8  /  Alb  4.5  /  TBili  0.6  /  DBili  x   /  AST  17  /  ALT  22  /  AlkPhos  75      Urinalysis Basic - ( 2021 11:20 )  Color: Yellow / Appearance: Hazy / S.034 / pH: x  Gluc: x / Ketone: Moderate  / Bili: Negative / Urobili: <2 mg/dL   Blood: x / Protein: 30 mg/dL / Nitrite: Negative   Leuk Esterase: Negative / RBC: 1 /HPF / WBC 3 /HPF   Sq Epi: x / Non Sq Epi: 1 /HPF / Bacteria: Negative    IMAGING STUDIES:  EXAM:  US ABDOMEN RT UPR QUADRANT    PROCEDURE DATE:  2021     IMPRESSION:  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 Possibly trapped 8 mm gallstone at fundus. No findings to indicate acute cholecystitis at this time.  Moderate severity diffuse hepatic steatosis.

## 2021-11-29 NOTE — ED PROVIDER NOTE - PROGRESS NOTE DETAILS
[place]: phosph noted, will replete in cdu, patient agrees, states he feels better, will PO challenge

## 2021-11-29 NOTE — ED CDU PROVIDER INITIAL DAY NOTE - MEDICAL DECISION MAKING DETAILS
41M w n/v x2 days, paresthesias, no abdominal pain, abdomen non tender, low suspicion for surgical pathology, placed in cdu for phosphorus repletion

## 2021-11-29 NOTE — ED ADULT NURSE NOTE - OBJECTIVE STATEMENT
Pt. is a 41 y.o. male who presents to Intake room 5 with complaints of nausea and vomiting x 3 days. Pt. is A&Ox4 and ambulatory. Pt. complains of having pain in his mid abdominal area. Pt. states last BM was no Saturday and has not been passing flatulence. Pt. states having dark colored emesis. Pt. abdomen soft and non distended and tender in the epigastric area. Respirations even and unlabored. Denies any fever, chills or muscle aches. Vital signs as noted, safety measures maintained, comfort measures provided, call bell in reach. #20g Rt. AC. Will continue to monitor.

## 2021-11-29 NOTE — CONSULT NOTE ADULT - ASSESSMENT
41M with no significant history presented with 2 days of nausea and vomiting found to have a phyringian variant of gallbladder on US with no acute cholecystitis, labs are all WNL and symptoms have mostly resolved with benign abdominal exam.     - Symptoms and history consistent with gastroenteritis, low suspicion for a biliary etiology.   - Supportive care with IV fluid and bowel rest and consider PO challenge again am.  41M with no significant history presented with 2 days of nausea and vomiting found to have a phyringian variant of gallbladder on US with no acute cholecystitis, labs are all WNL and symptoms have mostly resolved with benign abdominal exam, passing gas.     - Symptoms and history consistent with gastroenteritis, low suspicion for a biliary etiology.   - Supportive care with IV fluid and bowel rest and consider PO challenge again am.

## 2021-11-29 NOTE — ED PROVIDER NOTE - OBJECTIVE STATEMENT
41M w no reported medical history presents w/ 2 days of nausea and nbnb vomiting. States symptoms are not improved w/ zofran. Denies abdominal pain, h/o abdominal surgeries, marijuana use, chest pain, sob, f/c, uri symptoms, diarrhea.

## 2021-11-29 NOTE — ED CDU PROVIDER INITIAL DAY NOTE - OBJECTIVE STATEMENT
41M w p/w 2 days of n/v, bilateral hand tingling, no abdominal pain or tenderness, ekg w/o qtc prolongation, improved after reglan, placed in cdu for phosphorus repletion 41M w p/w 2 days of n/v, bilateral hand tingling, no abdominal pain or tenderness, ekg w/o qtc prolongation, improved after reglan, placed in cdu for phosphorus repletion    CDU KESHA Rich: Agree with above. Patient is a 40 y/o M no PMHx here w/ sudden onset of N/V x 2 days, now with epigastric discomfort and inability to tolerate PO. minimal urine output. Labs in the ED unremarkable, found to have low phosphorus. Sent to CDU for IV repletion, supportive care, and po challenge. RUQ US ordered as well to r/o gallbladder pathology.

## 2021-11-29 NOTE — ED CDU PROVIDER INITIAL DAY NOTE - PROGRESS NOTE DETAILS
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 Possibly trapped 8 mm gallstone at fundus. No findings to indicate acute cholecystitis at this time.   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 Possibly trapped 8 mm gallstone at fundus. No findings to indicate acute cholecystitis at this time. Moderate severity diffuse hepatic steatosis. On exam, abdomen is now soft and nontender. No vomiting. Pending surgery c/s. Pt received at sign out. Spoke to Surgery team, state that US finding of GB is normal variant, they are not concerned about possible "trapped stone" and feel sx more likely enteritis but will plan to keep patient NPO overnight and re-po challenge in am. surgery to reassess in AM. Not recommending further imaging at this time.

## 2021-11-29 NOTE — ED CDU PROVIDER INITIAL DAY NOTE - ATTENDING CONTRIBUTION TO CARE
41M w n/v x 2 days, no abdominal pain or tenderness, improved after reglan, phosphorus 1.7, given patient is w paresthesias, will placed in cdu for lyte repletion

## 2021-11-29 NOTE — ED CDU PROVIDER INITIAL DAY NOTE - PHYSICAL EXAMINATION
CONSTITUTIONAL: NAD, awake, alert  HEAD: Normocephalic; atraumatic  ENMT: External appears normal, MMM  CARD: Normal Sl, S2; no audible murmurs  RESP: normal wob, lungs ctab  ABD: soft, non-distended; non-tender  MSK: no edema, normal ROM in all four extremities  SKIN: Warm, dry, no rashes  NEURO: aaox3, moving all extremities spontaneously, 5/5 strength throughout, sensation grossly intact, no drift, no droop

## 2021-11-30 DIAGNOSIS — E66.9 OBESITY, UNSPECIFIED: ICD-10-CM

## 2021-11-30 DIAGNOSIS — E87.6 HYPOKALEMIA: ICD-10-CM

## 2021-11-30 DIAGNOSIS — K80.20 CALCULUS OF GALLBLADDER WITHOUT CHOLECYSTITIS WITHOUT OBSTRUCTION: ICD-10-CM

## 2021-11-30 DIAGNOSIS — R11.10 VOMITING, UNSPECIFIED: ICD-10-CM

## 2021-11-30 DIAGNOSIS — R10.9 UNSPECIFIED ABDOMINAL PAIN: ICD-10-CM

## 2021-11-30 DIAGNOSIS — R11.2 NAUSEA WITH VOMITING, UNSPECIFIED: ICD-10-CM

## 2021-11-30 LAB
ALBUMIN SERPL ELPH-MCNC: 4 G/DL — SIGNIFICANT CHANGE UP (ref 3.3–5)
ALP SERPL-CCNC: 66 U/L — SIGNIFICANT CHANGE UP (ref 40–120)
ALT FLD-CCNC: 21 U/L — SIGNIFICANT CHANGE UP (ref 4–41)
ANION GAP SERPL CALC-SCNC: 9 MMOL/L — SIGNIFICANT CHANGE UP (ref 7–14)
AST SERPL-CCNC: 14 U/L — SIGNIFICANT CHANGE UP (ref 4–40)
BASOPHILS # BLD AUTO: 0.03 K/UL — SIGNIFICANT CHANGE UP (ref 0–0.2)
BASOPHILS NFR BLD AUTO: 0.3 % — SIGNIFICANT CHANGE UP (ref 0–2)
BILIRUB SERPL-MCNC: 0.7 MG/DL — SIGNIFICANT CHANGE UP (ref 0.2–1.2)
BUN SERPL-MCNC: 9 MG/DL — SIGNIFICANT CHANGE UP (ref 7–23)
CALCIUM SERPL-MCNC: 8.5 MG/DL — SIGNIFICANT CHANGE UP (ref 8.4–10.5)
CHLORIDE SERPL-SCNC: 106 MMOL/L — SIGNIFICANT CHANGE UP (ref 98–107)
CO2 SERPL-SCNC: 26 MMOL/L — SIGNIFICANT CHANGE UP (ref 22–31)
CREAT SERPL-MCNC: 0.87 MG/DL — SIGNIFICANT CHANGE UP (ref 0.5–1.3)
CULTURE RESULTS: SIGNIFICANT CHANGE UP
EOSINOPHIL # BLD AUTO: 0.21 K/UL — SIGNIFICANT CHANGE UP (ref 0–0.5)
EOSINOPHIL NFR BLD AUTO: 2 % — SIGNIFICANT CHANGE UP (ref 0–6)
GLUCOSE SERPL-MCNC: 84 MG/DL — SIGNIFICANT CHANGE UP (ref 70–99)
HCT VFR BLD CALC: 43.6 % — SIGNIFICANT CHANGE UP (ref 39–50)
HGB BLD-MCNC: 14.4 G/DL — SIGNIFICANT CHANGE UP (ref 13–17)
IANC: 6.66 K/UL — SIGNIFICANT CHANGE UP (ref 1.5–8.5)
IMM GRANULOCYTES NFR BLD AUTO: 0.2 % — SIGNIFICANT CHANGE UP (ref 0–1.5)
LYMPHOCYTES # BLD AUTO: 2.81 K/UL — SIGNIFICANT CHANGE UP (ref 1–3.3)
LYMPHOCYTES # BLD AUTO: 26.7 % — SIGNIFICANT CHANGE UP (ref 13–44)
MAGNESIUM SERPL-MCNC: 2.2 MG/DL — SIGNIFICANT CHANGE UP (ref 1.6–2.6)
MCHC RBC-ENTMCNC: 28 PG — SIGNIFICANT CHANGE UP (ref 27–34)
MCHC RBC-ENTMCNC: 33 GM/DL — SIGNIFICANT CHANGE UP (ref 32–36)
MCV RBC AUTO: 84.8 FL — SIGNIFICANT CHANGE UP (ref 80–100)
MONOCYTES # BLD AUTO: 0.81 K/UL — SIGNIFICANT CHANGE UP (ref 0–0.9)
MONOCYTES NFR BLD AUTO: 7.7 % — SIGNIFICANT CHANGE UP (ref 2–14)
NEUTROPHILS # BLD AUTO: 6.66 K/UL — SIGNIFICANT CHANGE UP (ref 1.8–7.4)
NEUTROPHILS NFR BLD AUTO: 63.1 % — SIGNIFICANT CHANGE UP (ref 43–77)
NRBC # BLD: 0 /100 WBCS — SIGNIFICANT CHANGE UP
NRBC # FLD: 0 K/UL — SIGNIFICANT CHANGE UP
PHOSPHATE SERPL-MCNC: 2.6 MG/DL — SIGNIFICANT CHANGE UP (ref 2.5–4.5)
PLATELET # BLD AUTO: 260 K/UL — SIGNIFICANT CHANGE UP (ref 150–400)
POTASSIUM SERPL-MCNC: 3.3 MMOL/L — LOW (ref 3.5–5.3)
POTASSIUM SERPL-SCNC: 3.3 MMOL/L — LOW (ref 3.5–5.3)
PROT SERPL-MCNC: 6.7 G/DL — SIGNIFICANT CHANGE UP (ref 6–8.3)
RBC # BLD: 5.14 M/UL — SIGNIFICANT CHANGE UP (ref 4.2–5.8)
RBC # FLD: 12.9 % — SIGNIFICANT CHANGE UP (ref 10.3–14.5)
SODIUM SERPL-SCNC: 141 MMOL/L — SIGNIFICANT CHANGE UP (ref 135–145)
SPECIMEN SOURCE: SIGNIFICANT CHANGE UP
WBC # BLD: 10.54 K/UL — HIGH (ref 3.8–10.5)
WBC # FLD AUTO: 10.54 K/UL — HIGH (ref 3.8–10.5)

## 2021-11-30 PROCEDURE — 78226 HEPATOBILIARY SYSTEM IMAGING: CPT | Mod: 26,GC

## 2021-11-30 PROCEDURE — 99222 1ST HOSP IP/OBS MODERATE 55: CPT | Mod: GC

## 2021-11-30 PROCEDURE — 99217: CPT

## 2021-11-30 PROCEDURE — 99253 IP/OBS CNSLTJ NEW/EST LOW 45: CPT | Mod: GC

## 2021-11-30 RX ORDER — METOCLOPRAMIDE HCL 10 MG
10 TABLET ORAL
Refills: 0 | Status: DISCONTINUED | OUTPATIENT
Start: 2021-11-30 | End: 2021-12-01

## 2021-11-30 RX ORDER — ONDANSETRON 8 MG/1
4 TABLET, FILM COATED ORAL ONCE
Refills: 0 | Status: COMPLETED | OUTPATIENT
Start: 2021-11-30 | End: 2021-11-30

## 2021-11-30 RX ORDER — PANTOPRAZOLE SODIUM 20 MG/1
40 TABLET, DELAYED RELEASE ORAL ONCE
Refills: 0 | Status: COMPLETED | OUTPATIENT
Start: 2021-11-30 | End: 2021-11-30

## 2021-11-30 RX ORDER — ACETAMINOPHEN 500 MG
650 TABLET ORAL EVERY 6 HOURS
Refills: 0 | Status: DISCONTINUED | OUTPATIENT
Start: 2021-11-30 | End: 2021-12-01

## 2021-11-30 RX ORDER — SEMAGLUTIDE 0.68 MG/ML
0 INJECTION, SOLUTION SUBCUTANEOUS
Qty: 0 | Refills: 0 | DISCHARGE

## 2021-11-30 RX ORDER — ONDANSETRON 8 MG/1
4 TABLET, FILM COATED ORAL EVERY 8 HOURS
Refills: 0 | Status: DISCONTINUED | OUTPATIENT
Start: 2021-11-30 | End: 2021-12-01

## 2021-11-30 RX ORDER — POTASSIUM CHLORIDE 20 MEQ
40 PACKET (EA) ORAL ONCE
Refills: 0 | Status: COMPLETED | OUTPATIENT
Start: 2021-11-30 | End: 2021-11-30

## 2021-11-30 RX ORDER — INFLUENZA VIRUS VACCINE 15; 15; 15; 15 UG/.5ML; UG/.5ML; UG/.5ML; UG/.5ML
0.5 SUSPENSION INTRAMUSCULAR ONCE
Refills: 0 | Status: DISCONTINUED | OUTPATIENT
Start: 2021-11-30 | End: 2021-12-01

## 2021-11-30 RX ADMIN — Medication 40 MILLIEQUIVALENT(S): at 16:04

## 2021-11-30 RX ADMIN — ONDANSETRON 4 MILLIGRAM(S): 8 TABLET, FILM COATED ORAL at 09:20

## 2021-11-30 RX ADMIN — PANTOPRAZOLE SODIUM 40 MILLIGRAM(S): 20 TABLET, DELAYED RELEASE ORAL at 09:20

## 2021-11-30 RX ADMIN — Medication 10 MILLIGRAM(S): at 06:32

## 2021-11-30 RX ADMIN — Medication 10 MILLIGRAM(S): at 13:34

## 2021-11-30 NOTE — PATIENT PROFILE ADULT - FALL HARM RISK - HARM RISK INTERVENTIONS

## 2021-11-30 NOTE — CONSULT NOTE ADULT - ASSESSMENT
Impression:  40 yo M w/ PMhx obesity presenting w/ intractable nausea/vomiting x 3 days    # nausea/vomiting - patient w/ acute onset of symptoms, predominantly upper GI (no diarrhea). Differential includes infectious sources, such as viral gastritis, which will likely self-resolve with supportive care. Other GI luminal disorders (such as PUD) are possible as well, though pain is only a mild symptom in patient. DDx includes gallbladder pathology, given ultrasound findings, and can consider HIDA to rule out acute cholecystitis (despite normal liver enzymes). Additionally, patient's UA with calcium oxalate crystals, which may indicate possible nephrolithiasis, however it more likely is related to patient being dehydrated from ongoing vomiting (elevated urine osm, ketonuria), as the majority of patients with calcium oxalate crystals do not develop nephrolithiasis. Other non-GI etiologies include possible intracranial pathology, though will defer that evaluation (i.e. NCHCT) until other workup completed.     # hepatic steatosis  # cholelithiasis     Recommendations:  - IVF  - if diarrhea, check stool PCR  - antiemetics prn, patient w/ refractory symptoms despite reglan (dopamine antagonist) and zofran (serotonin antagonist), can trial alternate mechanism (ex emend)  - HIDA to evaluate for acute cholecystitis   - would defer NCHCT at this time pending above  - can slowly advance diet as tolerated (liquids today)  - rest of care per primary team    GI will continue to follow.     Sean Young, PGY5  Gastroenterology/Hepatology Fellow  Available on Microsoft Teams  [phone] ([employer] Short Range Pager)  [phone] (Long Range Pager)    After 5pm, please contact the on-call GI fellow. [phone]

## 2021-11-30 NOTE — CONSULT NOTE ADULT - SUBJECTIVE AND OBJECTIVE BOX
HPI:  40 yo M w/ PMHx obesity presenting w/ intractable nausea/vomiting x 3 days.   Patient was in USOH until 21 when developed sudden onset of nausea and vomiting. Emesis appeared like food -> bilious emesis, no hematemesis. During this time he had mild abdominal pain, located in the epigastric/RUQ region. No diarrhea during this time. He has no new medications, no sick contacts, no new food, no recent travel. No similar episodes in the past. No marijuana use, no EtOH use. Patient has intentional 50 lbs weight loss over the past 6 months. No FHx of GI malignancy.     On presentation patient HDS, labs wnl (WBC 10), RUQ US w/ 8mm fundus gallstone. UA showed calcium oxalate crystals. Seen by surgery, admitted to CDU, given zofran/reglan with some improvement. On 21 patient tried solid food, no dysphagia/odynophagia, however several minutes after eating patient had recurrent nausea and vomiting.     Allergies:  No Known Allergies      Home Medications:    Hospital Medications:  sodium chloride 0.9%. 1000 milliLiter(s) IV Continuous <Continuous>      PMHX/PSHX:  No pertinent past medical history    Cholelithiases    Hypokalemia    No significant past surgical history        Family history:  No pertinent family history in first degree relatives        Denies family history of colon cancer/polyps, stomach cancer/polyps, pancreatic cancer/masses, liver cancer/disease, ovarian cancer and endometrial cancer.    Social History:   Tob: Denies  EtOH: Denies  Illicit Drugs: Denies    ROS:     General:  No wt loss, fevers, chills, night sweats, fatigue  Eyes:  Good vision, no reported pain  ENT:  No sore throat, pain, runny nose, dysphagia  CV:  No pain, palpitations, hypo/hypertension  Pulm:  No dyspnea, cough, tachypnea, wheezing  GI:  see HPI  :  No pain, bleeding, incontinence, nocturia  Muscle:  No pain, weakness  Neuro:  No weakness, tingling, memory problems  Psych:  No fatigue, insomnia, mood problems, depression  Endocrine:  No polyuria, polydipsia, cold/heat intolerance  Heme:  No petechiae, ecchymosis, easy bruisability  Skin:  No rash, tattoos, scars, edema    PHYSICAL EXAM:     GENERAL:  No acute distress  HEENT:  NCAT, no scleral icterus   CHEST:  no respiratory distress  HEART:  Regular rate and rhythm  ABDOMEN:  obese, soft, mildly tender to palpation in RUQ/epigastric region, no r/g  EXTREMITIES: No edema  SKIN:  No rash/erythema/ecchymoses/petechiae/wounds/abscess/warm/dry  NEURO:  Alert and oriented x 3, no asterixis    Vital Signs:  Vital Signs Last 24 Hrs  T(C): 37 (2021 10:37), Max: 37.1 (2021 13:46)  T(F): 98.6 (2021 10:37), Max: 98.8 (2021 13:46)  HR: 67 (2021 10:37) (60 - 69)  BP: 124/71 (2021 10:37) (100/63 - 124/71)  BP(mean): --  RR: 17 (2021 10:37) (16 - 18)  SpO2: 100% (2021 10:37) (98% - 100%)  Daily     Daily     LABS:                        14.4   10.54 )-----------( 260      ( 2021 07:07 )             43.6     Mean Cell Volume: 84.8 fL (- @ 07:07)        141  |  106  |  9   ----------------------------<  84  3.3<L>   |  26  |  0.87    Ca    8.5      2021 07:07  Phos  2.6       Mg     2.20         TPro  6.7  /  Alb  4.0  /  TBili  0.7  /  DBili  x   /  AST  14  /  ALT  21  /  AlkPhos  66  30    LIVER FUNCTIONS - ( 2021 07:07 )  Alb: 4.0 g/dL / Pro: 6.7 g/dL / ALK PHOS: 66 U/L / ALT: 21 U/L / AST: 14 U/L / GGT: x             Urinalysis Basic - ( 2021 11:20 )    Color: Yellow / Appearance: Hazy / S.034 / pH: x  Gluc: x / Ketone: Moderate  / Bili: Negative / Urobili: <2 mg/dL   Blood: x / Protein: 30 mg/dL / Nitrite: Negative   Leuk Esterase: Negative / RBC: 1 /HPF / WBC 3 /HPF   Sq Epi: x / Non Sq Epi: 1 /HPF / Bacteria: Negative      Amylase Serum--      Lipase serum17       Ammonia--                          14.4   10.54 )-----------( 260      ( 2021 07:07 )             43.6                         14.5   9.50  )-----------( 298      ( 2021 11:11 )             42.8       Imaging:  [facility] US 21  FINDINGS:    Liver: Increased echogenicity. Main portal vein normal directional flow.  Bile ducts: Normal caliber. Common bile duct measures 5 mm.  Gallbladder: Phyringian cap folded fundal configuration. Fundal wall thickening and foci of comet tailring down suggestive for adenomyomatosis. Echogenic shadowing 8 mm gallstone possibly trapped at the fundus.  No pericholecystic fluid.   Sonographer notes indicate a negative sonographic Estrella's sign.  Pancreas: Visualized portions are within normal limits.  Right kidney: 14.0 cm. No hydronephrosis. Duplicated right renal collecting system  Ascites: None.  IVC: Visualized portions are within normal limits.    IMPRESSION:    Phyringian cap folded fundal configuration likely with fundal adenomyomatosis. Possibly trapped 8 mm gallstone at fundus. No findings to indicate acute cholecystitis at this time.    Moderate severity diffuse hepatic steatosis.

## 2021-11-30 NOTE — ED CDU PROVIDER SUBSEQUENT DAY NOTE - PHYSICAL EXAMINATION
Vital signs reviewed.   CONSTITUTIONAL: Well-appearing; well-nourished; in no apparent distress. Non-toxic appearing.   HEAD: Normocephalic, atraumatic.  RESP: NAD  EXT/MS: moves all extremities  SKIN: Normal for age and race

## 2021-11-30 NOTE — ED CDU PROVIDER DISPOSITION NOTE - CLINICAL COURSE
Jakob Howard DO:  patient seen and evaluated with the PA.  I was present for key portions of the History & Physical, and I agree with the Impression & Plan. 42 yo m no reported pmh pw several days of intractable n/v. brought to cdu for inability to tolerate po. had US showing "Phyringian cap folded fundal configuration likely with fundal adenomyomatosis. Possibly trapped 8 mm gallstone at fundus. No findings to indicate acute cholecystitis at this time". surgery consulted, no acute recs. pt denies etoh abuse, and marijuana use. has persistent sx today, no emesis however continues to dry heave. will admit to hospital for further w/u. abd soft. pt passing gas, having BMs. do not suspect sbo.

## 2021-11-30 NOTE — H&P ADULT - HISTORY OF PRESENT ILLNESS
>>>>>>Medical Attending Initial / Admission note<<<<<<  -------------------------------------------------------------------------------  CHIEF COMPLAINT & HISTORY OF PRESENT ILLNESS:      Pt is a 40 y/o man with no significant PMH ( obesity on Ozempic ) p/w 2 days of n/v, without abdominal pain or tenderness, improved after reglan, placed in cdu for phosphorus repletion  in CDU with more vomiting episodes and epigastric discomfort and inability to tolerate PO.   placed on IVH as minimal urine output and RUQ US showed cholelithiasis  Sx and GI consulted and pt admitted to medicine service for further mgmt    --------------------------------------------------------------------------------  PAST MEDICAL HISTORY:    obesity    --------------------------------------------------------------------------------  PAST SURGICAL HISTORY:    none reported     --------------------------------------------------------------------------------  FAMILY HISTORY:    noncontributory     --------------------------------------------------------------------------------  SOCIAL HISTORY:  Alcohol: None reported  Smoking: None reported      no MJ / drug use   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       --------------------------------------------------------------------------------  ALLERGIES:    [As racquel, reviewed]    --------------------------------------------------------------------------------  MEDICATIONS:   [As racquel, reviewed]    --------------------------------------------------------------------------------  REVIEW OF SYSTEM:    GEN: no fever, no chills, no pain  RESP: no SOB, no cough, no sputum  CVS: no chest pain, no palpitations, no edema  GI: no abdominal pain, + nausea,  vomiting this morning , no constipation, no diarrhea  : no dysuria, no frequency, no hematuria  Neuro: no headache, no dizziness  PSYCH: no anxiety, no depression  Derm : no itching, no rash    --------------------------------------------------------------------------------  VITAL SIGNS:     Vital Signs Last 24 HrsT(C): 37 (11-30-21 @ 10:37)  T(F): 98.6 (11-30-21 @ 10:37), Max: 98.8 (11-29-21 @ 13:46)  HR: 67 (11-30-21 @ 10:37) (60 - 69)  BP: 124/71 (11-30-21 @ 10:37)  RR: 17 (11-30-21 @ 10:37) (16 - 18)  SpO2: 100% (11-30-21 @ 10:37) (98% - 100%)      --------------------------------------------------------------------------------  PHYSICAL EXAM:    GEN: A&O X 3 , NAD , comfortable, pleasant, calm  HEENT: NCAT, PERRL, MMM, hearing intact  Neck: supple , no JVD  CVS: S1S2 , regular , No M/R/G appreciated  PULM: CTA B/L,  no W/R/R appreciated  ABD.: soft. mild epigastric tenderness, non distended,  bowel sounds decreased, no murphys sign   Extrem: intact pulses , no edema   Derm: No rash , no ecchymoses  PSYCH : normal mood,  no delusion not anxious    --------------------------------------------------------------------------------  LAB AND IMAGING:   [As racquel, reviewed]    --------------------------------------------------------------------------------  ASSESSMENT AND PLAN:   [As bellow]    --------------------  Case discussed with pt..   ___________________________  H. LINUS Simon.  Pager: [phone]

## 2021-11-30 NOTE — ED CDU PROVIDER SUBSEQUENT DAY NOTE - HISTORY
Pt is a 41M w no PMHx who presented to ED c.o n/v x few days. Pt seen and worked up in ED; Labs otherwise unremarkable besides phosphorus of 1.7, patient was unable to tolerate PO. Pt transferred to CDU for; IVF, antiemetics. While in CDU patient underwent US abdomen which showed "Phyringian cap folded fundal configuration likely with fundal adenomyomatosis. Possibly trapped 8 mm gallstone at fundus. No findings to indicate acute cholecystitis at this time".     While in CDU Surgery consulted, does not recommend any surgical intervention or further imaging. States to continue IVF and po challenge in AM. Pt pending labs in AM. No acute events overnight. Vitals stable. No complaints at this time. Will f/u labs and po challenge.

## 2021-11-30 NOTE — H&P ADULT - ASSESSMENT
42 y/o man with no significant PMH ( obesity on Ozempic ) p/w 2 days of n/v, >> abdominal pain, cholelithiasis

## 2021-11-30 NOTE — PROGRESS NOTE ADULT - ASSESSMENT
41M with no significant history presented with 2 days of nausea and vomiting found to have a phyringian variant of gallbladder on US with no acute cholecystitis, labs are all WNL and symptoms have mostly resolved with benign abdominal exam, passing gas.     Plan  - Recommend GI consult, workup  - Supportive care with IV fluid and bowel rest   - Pain control as needed  - OOBAT  - Please contact with any further questions    KAMRYN AGRAWAL Team Surgery  [phone]

## 2021-11-30 NOTE — CONSULT NOTE ADULT - PROVIDER SPECIALTY LIST ADULT
Anesthesia Focused Assessment    Hx of anesthesia complications:  no  Family hx of anesthesia complications:  no      Prior + Covid-19 test? no        STOP-BANG Sleep Apnea Questionnaire    SNORE loudly (heard through closed doors)? No  TIRED, fatigued, sleepy during daytime? No  OBSERVED stopping breathing during sleep? No  High blood PRESSURE or being treated? Yes    BMI over 35? No  AGE over 48? Yes  NECK circumference over 16\"? No  GENDER (male)? No             Total 2  High risk 5-8  Intermediate risk 3-4  Low risk 0-2    ----------------------------------------------------------------------------------------------------------------------  LAURA                              No  If yes, machine? DM1                                            No  DM2                   No    Coronary Artery Disease      No  HTN         Yes  Defib/AICD/Pacemaker               No             Renal Failure                   No  If yes, on dialysis           Active smoker? No  Drinks alcohol? No  Illicit drugs? No  Dentition? Wears partial upper dentures      Past Medical History:   Diagnosis Date    Flank pain     left    Hearing impaired     Hematuria     Hyperlipidemia     Hypertension     managed by Dr. Scar Reyna Kidney stone     PONV (postoperative nausea and vomiting)     Under care of team     [facility] [address] examination 09/2021    Dr. Ronald Garcia         Patient was evaluated in [facility] & anesthesia guidelines were applied. NPO guidelines, medication instructions and scheduled arrival time were reviewed with patient.                                                                                                                      Anesthesia contacted:   no    Medical or cardiac clearance ordered: no    Pooja Melo, DELIO - CNP   10/13/21  10:03 AM
Gastroenterology
Surgery

## 2021-11-30 NOTE — ED CDU PROVIDER DISPOSITION NOTE - ATTENDING CONTRIBUTION TO CARE
Jakob Howard DO:  patient seen and evaluated with the PA.  I was present for key portions of the History & Physical, and I agree with the Impression & Plan. 40 yo m no reported pmh pw several days of intractable n/v. brought to cdu for inability to tolerate po. had US showing "Phyringian cap folded fundal configuration likely with fundal adenomyomatosis. Possibly trapped 8 mm gallstone at fundus. No findings to indicate acute cholecystitis at this time". surgery consulted, no acute recs. pt denies etoh abuse, and marijuana use. has persistent sx today, no emesis however continues to dry heave. will admit to hospital for further w/u. abd soft. pt passing gas, having BMs. do not suspect sbo.

## 2021-11-30 NOTE — PATIENT PROFILE ADULT - FUNCTIONAL ASSESSMENT - DAILY ACTIVITY 2.
4 = No assist / stand by assistance Ketoconazole Pregnancy And Lactation Text: This medication is Pregnancy Category C and it isn't know if it is safe during pregnancy. It is also excreted in breast milk and breast feeding isn't recommended.

## 2021-11-30 NOTE — ED CDU PROVIDER SUBSEQUENT DAY NOTE - ATTENDING CONTRIBUTION TO CARE
Jakob Howard DO:  patient seen and evaluated with the PA.  I was present for key portions of the History & Physical, and I agree with the Impression & Plan. 42 yo m no reported pmh pw several days of intractable n/v. brought to cdu for inability to tolerate po. had US showing "Phyringian cap folded fundal configuration likely with fundal adenomyomatosis. Possibly trapped 8 mm gallstone at fundus. No findings to indicate acute cholecystitis at this time". surgery consulted, no acute recs. pt denies etoh abuse, and marijuana use. has persistent sx today, no emesis however continues to dry heave. will admit to hospital for further w/u. abd soft. pt passing gas, having BMs. do not suspect sbo. endorsed to hospitalist.

## 2021-11-30 NOTE — H&P ADULT - NSHPOUTPATIENTPROVIDERS_GEN_ALL_CORE
Dr AVILA Tanner Alert and oriented to person, place, time/situation. normal mood and affect. no apparent risk to self or others.

## 2021-11-30 NOTE — PHARMACOTHERAPY INTERVENTION NOTE - COMMENTS
Medication history is complete. Medication list updated in Outpatient Medication Record (OMR). Please call spectra [phone] if you have any questions.

## 2021-11-30 NOTE — ED ADULT NURSE REASSESSMENT NOTE - NS ED NURSE REASSESS COMMENT FT1
Pt arrived from ED for testing and pt is c/o nausea on arrival reglan given as ordered. Pt states feels better at time of imaging on the table.

## 2021-12-01 ENCOUNTER — TRANSCRIPTION ENCOUNTER (OUTPATIENT)
Age: 41
End: 2021-12-01

## 2021-12-01 VITALS
TEMPERATURE: 98 F | SYSTOLIC BLOOD PRESSURE: 111 MMHG | DIASTOLIC BLOOD PRESSURE: 64 MMHG | HEART RATE: 75 BPM | OXYGEN SATURATION: 100 % | RESPIRATION RATE: 18 BRPM

## 2021-12-01 LAB
ALBUMIN SERPL ELPH-MCNC: 4 G/DL — SIGNIFICANT CHANGE UP (ref 3.3–5)
ALP SERPL-CCNC: 66 U/L — SIGNIFICANT CHANGE UP (ref 40–120)
ALT FLD-CCNC: 23 U/L — SIGNIFICANT CHANGE UP (ref 4–41)
ANION GAP SERPL CALC-SCNC: 10 MMOL/L — SIGNIFICANT CHANGE UP (ref 7–14)
AST SERPL-CCNC: 16 U/L — SIGNIFICANT CHANGE UP (ref 4–40)
BASOPHILS # BLD AUTO: 0.02 K/UL — SIGNIFICANT CHANGE UP (ref 0–0.2)
BASOPHILS NFR BLD AUTO: 0.3 % — SIGNIFICANT CHANGE UP (ref 0–2)
BILIRUB SERPL-MCNC: 0.7 MG/DL — SIGNIFICANT CHANGE UP (ref 0.2–1.2)
BUN SERPL-MCNC: 8 MG/DL — SIGNIFICANT CHANGE UP (ref 7–23)
CALCIUM SERPL-MCNC: 8.7 MG/DL — SIGNIFICANT CHANGE UP (ref 8.4–10.5)
CHLORIDE SERPL-SCNC: 105 MMOL/L — SIGNIFICANT CHANGE UP (ref 98–107)
CHOLEST SERPL-MCNC: 124 MG/DL — SIGNIFICANT CHANGE UP
CO2 SERPL-SCNC: 23 MMOL/L — SIGNIFICANT CHANGE UP (ref 22–31)
CREAT SERPL-MCNC: 0.79 MG/DL — SIGNIFICANT CHANGE UP (ref 0.5–1.3)
EOSINOPHIL # BLD AUTO: 0.16 K/UL — SIGNIFICANT CHANGE UP (ref 0–0.5)
EOSINOPHIL NFR BLD AUTO: 2.2 % — SIGNIFICANT CHANGE UP (ref 0–6)
GLUCOSE SERPL-MCNC: 88 MG/DL — SIGNIFICANT CHANGE UP (ref 70–99)
HCT VFR BLD CALC: 42.7 % — SIGNIFICANT CHANGE UP (ref 39–50)
HDLC SERPL-MCNC: 28 MG/DL — LOW
HGB BLD-MCNC: 14.3 G/DL — SIGNIFICANT CHANGE UP (ref 13–17)
IANC: 4.13 K/UL — SIGNIFICANT CHANGE UP (ref 1.5–8.5)
IMM GRANULOCYTES NFR BLD AUTO: 0.1 % — SIGNIFICANT CHANGE UP (ref 0–1.5)
LIPID PNL WITH DIRECT LDL SERPL: 65 MG/DL — SIGNIFICANT CHANGE UP
LYMPHOCYTES # BLD AUTO: 2.34 K/UL — SIGNIFICANT CHANGE UP (ref 1–3.3)
LYMPHOCYTES # BLD AUTO: 32 % — SIGNIFICANT CHANGE UP (ref 13–44)
MCHC RBC-ENTMCNC: 28 PG — SIGNIFICANT CHANGE UP (ref 27–34)
MCHC RBC-ENTMCNC: 33.5 GM/DL — SIGNIFICANT CHANGE UP (ref 32–36)
MCV RBC AUTO: 83.6 FL — SIGNIFICANT CHANGE UP (ref 80–100)
MONOCYTES # BLD AUTO: 0.66 K/UL — SIGNIFICANT CHANGE UP (ref 0–0.9)
MONOCYTES NFR BLD AUTO: 9 % — SIGNIFICANT CHANGE UP (ref 2–14)
NEUTROPHILS # BLD AUTO: 4.13 K/UL — SIGNIFICANT CHANGE UP (ref 1.8–7.4)
NEUTROPHILS NFR BLD AUTO: 56.4 % — SIGNIFICANT CHANGE UP (ref 43–77)
NON HDL CHOLESTEROL: 96 MG/DL — SIGNIFICANT CHANGE UP
NRBC # BLD: 0 /100 WBCS — SIGNIFICANT CHANGE UP
NRBC # FLD: 0 K/UL — SIGNIFICANT CHANGE UP
PLATELET # BLD AUTO: 263 K/UL — SIGNIFICANT CHANGE UP (ref 150–400)
POTASSIUM SERPL-MCNC: 3.8 MMOL/L — SIGNIFICANT CHANGE UP (ref 3.5–5.3)
POTASSIUM SERPL-SCNC: 3.8 MMOL/L — SIGNIFICANT CHANGE UP (ref 3.5–5.3)
PROT SERPL-MCNC: 6.9 G/DL — SIGNIFICANT CHANGE UP (ref 6–8.3)
RBC # BLD: 5.11 M/UL — SIGNIFICANT CHANGE UP (ref 4.2–5.8)
RBC # FLD: 12.8 % — SIGNIFICANT CHANGE UP (ref 10.3–14.5)
SODIUM SERPL-SCNC: 138 MMOL/L — SIGNIFICANT CHANGE UP (ref 135–145)
TRIGL SERPL-MCNC: 154 MG/DL — HIGH
WBC # BLD: 7.32 K/UL — SIGNIFICANT CHANGE UP (ref 3.8–10.5)
WBC # FLD AUTO: 7.32 K/UL — SIGNIFICANT CHANGE UP (ref 3.8–10.5)

## 2021-12-01 PROCEDURE — 99232 SBSQ HOSP IP/OBS MODERATE 35: CPT | Mod: GC

## 2021-12-01 NOTE — PROGRESS NOTE ADULT - ASSESSMENT
M E D I C A L   A T T E N D I N G    F O L L O W    U P   N O T E  (12-01-21 )                                     ------------------------------------------------------------------------------------------------    patient evaluated by me, case discussed with team, chart, medications, and physical exam reviewed, labs / tests  and vitals reviewed by me, as bellow.   Patient is stable for discharge today. patient had been on regular diet and had 2 meals with no vomiting for the past 24 hrs... overall otherwise feels well, cleared by GI and will follow with GI as OP   Patient to follow up with  PMD, GI   See discharge document for full note.                             ( note written / Date of service  12-01-21 )    ==================>> MEDICATIONS <<====================    influenza   Vaccine 0.5 milliLiter(s) IntraMuscular once  sodium chloride 0.9%. 1000 milliLiter(s) IV Continuous <Continuous>    MEDICATIONS  (PRN):  acetaminophen     Tablet .. 650 milliGRAM(s) Oral every 6 hours PRN Temp greater or equal to 38C (100.4F), Mild Pain (1 - 3)  metoclopramide 10 milliGRAM(s) Oral four times a day PRN nausea  ondansetron Injectable 4 milliGRAM(s) IV Push every 8 hours PRN Nausea and/or Vomiting    ___________  Active diet:  Diet, Regular:   DASH/TLC Sodium & Cholesterol Restricted (DASH)  ___________________    ==================>> VITAL SIGNS <<==================    T(C): 36.4 (12-01-21 @ 14:02), Max: 37 (11-30-21 @ 18:20)  HR: 75 (12-01-21 @ 14:02) (65 - 75)  BP: 111/64 (12-01-21 @ 14:02) (106/66 - 129/72)  BP(mean): --  RR: 18 (12-01-21 @ 14:02) (15 - 18)  SpO2: 100% (12-01-21 @ 14:02) (98% - 100%)        ==================>> LAB AND IMAGING <<==================                        14.3   7.32  )-----------( 263      ( 01 Dec 2021 07:45 )             42.7        12-01    138  |  105  |  8   ----------------------------<  88  3.8   |  23  |  0.79    Ca    8.7      01 Dec 2021 07:45  Phos  2.6     11-30  Mg     2.20     11-30    TPro  6.9  /  Alb  4.0  /  TBili  0.7  /  DBili  x   /  AST  16  /  ALT  23  /  AlkPhos  66  12-01                  Lipid profile:  (12-01-21)     Total: 124     LDL  : (p)     HDL  :28     TG   :154     WBC count:   7.32 <<== ,  10.54 <<== ,  9.50 <<==   Hemoglobin:   14.3 <<==,  14.4 <<==,  14.5 <<==  platelets:  263 <==, 260 <==, 298 <==    Creatinine:  0.79  <<==, 0.87  <<==, 0.93  <<==  Sodium:   138  <==, 141  <==, 142  <==       AST:          16 <== , 14 <== , 17 <==      ALT:        23  <== , 21  <== , 22  <==      AP:        66  <=, 66  <=, 75  <=     Bili:        0.7  <=, 0.7  <=, 0.6  <=

## 2021-12-01 NOTE — PROGRESS NOTE ADULT - ATTENDING COMMENTS
I have personally interviewed and examined this patient, reviewed pertinent labs and imaging, and discussed the case with colleagues, residents, and physician assistants on B Team rounds.  More than 50% of this 35 minute encounter including face to face with the patient was spent counseling and/or coordination of care on nausea.  Time included review of vitals, labs, imaging, discussion with consultants and coordination with the operating room/emergency department.    42yo M admitted with nausea and vomiting. Pt reported feeling better overnight, but nausea returned this AM. Denies abdominal pain. No sick contacts. Unlikely biliary pathology, given history of no abdominal pain. Suspect gastroenteritis.     - GI eval  - No acute surgical intervention   - Will follow results of HIDA    The active care issues are:  1. Nausea/vomiting    The Acute Care Surgery (B Team) Attending Group Practice:  Dr. Lucinda Bailey, Dr. Earle Fan, Dr. Marcello Martinez, Dr. Mega Gay, Dr. Beatriz Norman    urgent issues - spectra [phone]  nonurgent issues - [phone]  patient appointments or afterhours - [phone]
Reports resolution of nausea/vomiting since yesterday afternoon. Overall feels much better without any complaints. Tolerated breakfast today and has had 2 bowel movements.   As patient's clinical symptoms improved, plan to defer endoscopic evaluation. If, however, symptoms recur, patient understands that EGD and/or cross-sectional imaging would be recommended.  No other inpatient GI interventions planned.

## 2021-12-01 NOTE — DISCHARGE NOTE PROVIDER - NSDCCAREPROVSEEN_GEN_ALL_CORE_FT
Juan Manuel Simon Hoorbod Delshadfar  Hoorbod Delshadfar  Hoorbod Delshadfar  Malaika Mal Plascencia Rai  User ADM  Earle Webb  Team [facility] Medicine ACP  Team [facility] Gastroenterology

## 2021-12-01 NOTE — DISCHARGE NOTE NURSING/CASE MANAGEMENT/SOCIAL WORK - NSDCPEFALRISK_GEN_ALL_CORE
For information on Fall & Injury Prevention, visit: [email or web address] OR  [email or web address] OR  https://www.cdc.gov/steadi/patient.html

## 2021-12-01 NOTE — DISCHARGE NOTE PROVIDER - NSDCCPCAREPLAN_GEN_ALL_CORE_FT
PRINCIPAL DISCHARGE DIAGNOSIS  Diagnosis: Nausea & vomiting  Assessment and Plan of Treatment: improved/  Negative HIDA scan for cholecystitis.  F/U with PMD in 1 week.

## 2021-12-01 NOTE — DISCHARGE NOTE NURSING/CASE MANAGEMENT/SOCIAL WORK - PATIENT PORTAL LINK FT
You can access the FollowMyHealth Patient Portal offered by [facility] by registering at the following website: [email or web address]. By joining [employer]’s FollowMyHealth portal, you will also be able to view your health information using other applications (apps) compatible with our system.

## 2021-12-01 NOTE — PROGRESS NOTE ADULT - SUBJECTIVE AND OBJECTIVE BOX
SUBJECTIVE: Pt seen and examined on rounds with team. No acute events overnight.        OBJECTIVE    PHYSICAL EXAM:   General: NAD, Lying in bed   Neuro: Awake and alert  GI/Abd: Soft, non-distended, non-tender      VITALS  T(C): 37 (11-30-21 @ 10:37), Max: 37.1 (11-29-21 @ 13:46)  HR: 67 (11-30-21 @ 10:37) (60 - 69)  BP: 124/71 (11-30-21 @ 10:37) (100/63 - 124/71)  RR: 17 (11-30-21 @ 10:37) (16 - 18)  SpO2: 100% (11-30-21 @ 10:37) (98% - 100%)  CAPILLARY BLOOD GLUCOSE          Is/Os      MEDICATIONS (STANDING): sodium chloride 0.9%. 1000 milliLiter(s) IV Continuous <Continuous>    MEDICATIONS (PRN):acetaminophen     Tablet .. 650 milliGRAM(s) Oral every 6 hours PRN Temp greater or equal to 38C (100.4F), Mild Pain (1 - 3)  metoclopramide 10 milliGRAM(s) Oral four times a day PRN nausea  ondansetron Injectable 4 milliGRAM(s) IV Push every 8 hours PRN Nausea and/or Vomiting      LABS  CBC (11-30 @ 07:07)                              14.4                           10.54<H>  )----------------(  260        63.1  % Neutrophils, 26.7  % Lymphocytes, ANC: 6.66                                43.6    CBC (11-29 @ 11:11)                              14.5                           9.50    )----------------(  298        83.0<H>% Neutrophils, 11.7<L>% Lymphocytes, ANC: 7.88<H>                              42.8      BMP (11-30 @ 07:07)             141     |  106     |  9     		Ca++ --      Ca 8.5                ---------------------------------( 84    		Mg 2.20               3.3<L>  |  26      |  0.87  			Ph 2.6     BMP (11-29 @ 11:18)             --      |  --      |  --    		Ca++ --      Ca --                 ---------------------------------( --    		Mg 2.00               --      |  --      |  --    			Ph 1.7<L>    LFTs (11-30 @ 07:07)      TPro 6.7 / Alb 4.0 / TBili 0.7 / DBili -- / AST 14 / ALT 21 / AlkPhos 66  LFTs (11-29 @ 11:11)      TPro 7.8 / Alb 4.5 / TBili 0.6 / DBili -- / AST 17 / ALT 22 / AlkPhos 75              IMAGING STUDIES    
Gastroenterology/Hepatology Progress Note      Interval Events:   - patient's symptoms improved as of 2pm yesterday, tolerated liquid diet last night, no new complaints today    Allergies:  No Known Allergies      Hospital Medications:  acetaminophen     Tablet .. 650 milliGRAM(s) Oral every 6 hours PRN  influenza   Vaccine 0.5 milliLiter(s) IntraMuscular once  metoclopramide 10 milliGRAM(s) Oral four times a day PRN  ondansetron Injectable 4 milliGRAM(s) IV Push every 8 hours PRN  sodium chloride 0.9%. 1000 milliLiter(s) IV Continuous <Continuous>        PHYSICAL EXAM:   Vital Signs:  Vital Signs Last 24 Hrs  T(C): 36.8 (01 Dec 2021 06:54), Max: 37 (2021 10:37)  T(F): 98.2 (01 Dec 2021 06:54), Max: 98.6 (2021 10:37)  HR: 66 (01 Dec 2021 06:54) (65 - 84)  BP: 129/72 (01 Dec 2021 06:54) (97/82 - 129/72)  BP(mean): --  RR: 16 (01 Dec 2021 06:54) (15 - 17)  SpO2: 100% (01 Dec 2021 06:54) (98% - 100%)  Daily     Daily     GENERAL:  No acute distress  HEENT:  NCAT, no scleral icterus  CHEST: no resp distress  HEART:  RRR  ABDOMEN:  Soft, non-tender, non-distended, normoactive bowel sounds, no masses  EXTREMITIES:  No cyanosis, clubbing, or edema  SKIN:  No rash/erythema/ecchymoses/petechiae/wounds/abscess/warm/dry  NEURO:  Alert and oriented x 3, no asterixis, no tremor    LABS:                        14.3   7.32  )-----------( 263      ( 01 Dec 2021 07:45 )             42.7     Mean Cell Volume: 83.6 fL (- @ 07:45)        138  |  105  |  8   ----------------------------<  88  3.8   |  23  |  0.79    Ca    8.7      01 Dec 2021 07:45  Phos  2.6     11-30  Mg     2.20     11-30    TPro  6.9  /  Alb  4.0  /  TBili  0.7  /  DBili  x   /  AST  16  /  ALT  23  /  AlkPhos  66  12-    LIVER FUNCTIONS - ( 01 Dec 2021 07:45 )  Alb: 4.0 g/dL / Pro: 6.9 g/dL / ALK PHOS: 66 U/L / ALT: 23 U/L / AST: 16 U/L / GGT: x             Urinalysis Basic - ( 2021 11:20 )    Color: Yellow / Appearance: Hazy / S.034 / pH: x  Gluc: x / Ketone: Moderate  / Bili: Negative / Urobili: <2 mg/dL   Blood: x / Protein: 30 mg/dL / Nitrite: Negative   Leuk Esterase: Negative / RBC: 1 /HPF / WBC 3 /HPF   Sq Epi: x / Non Sq Epi: 1 /HPF / Bacteria: Negative            Imaging:

## 2021-12-01 NOTE — DISCHARGE NOTE PROVIDER - HOSPITAL COURSE
42 yo M w/ PMhx obesity presenting w/ intractable nausea/vomiting x 3 days, now improved    # n/v - etiology unclear, suspect 2/2 acute viral etiology, given rapid improvement today. DDx includes GI luminal pathology (i.e. PUD), vs non-GI sources (such as cholecystitis, nephrolithiasis, intracranial pathology), however less likely. HIDA negative for cholecystitis     Recommendations:  - advance to regular diet this morning, if tolerating regular diet can be discharged, if not tolerating regular diet will plan for EGD tomorrow  Reports resolution of nausea/vomiting since yesterday afternoon. Overall feels much better without any complaints. Tolerated breakfast today and has had 2 bowel movements.   As patient's clinical symptoms improved, plan to defer endoscopic evaluation. If, however, symptoms recur, patient understands that EGD and/or cross-sectional imaging would be recommended.  No other inpatient GI interventions planned.     Dispo: stable for DC.  D/W attending

## 2021-12-01 NOTE — DISCHARGE NOTE PROVIDER - CARE PROVIDER_API CALL
Johnathan Tanner)  Internal Medicine; Nephrology  [address], [address]  [place] [address]  Phone: [phone]  Fax: [phone]  Follow Up Time: 1 week

## 2021-12-01 NOTE — PROGRESS NOTE ADULT - ASSESSMENT
Impression:  42 yo M w/ PMhx obesity presenting w/ intractable nausea/vomiting x 3 days, now improved    # n/v - etiology unclear, suspect 2/2 acute viral etiology, given rapid improvement today. DDx includes GI luminal pathology (i.e. PUD), vs non-GI sources (such as cholecystitis, nephrolithiasis, intracranial pathology), however less likely. HIDA negative for cholecystitis     Recommendations:  - advance to regular diet this morning, if tolerating regular diet can be discharged, if not tolerating regular diet will plan for EGD tomorrow  - rest of care per primary team    GI will continue to follow.     Sean Young, PGY5  Gastroenterology/Hepatology Fellow  Available on [employer] Teams  [phone] ([facility] [facility] Short Range Pager)  [phone] (Long Range Pager)    After 5pm, please contact the on-call GI fellow. [phone]   Impression:  40 yo M w/ PMhx obesity presenting w/ intractable nausea/vomiting x 3 days, now improved    # n/v - etiology unclear, suspect 2/2 acute viral etiology, given rapid improvement today. DDx includes GI luminal pathology (i.e. PUD), vs non-GI sources (such as cholecystitis, nephrolithiasis, intracranial pathology), however less likely. HIDA negative for cholecystitis     Recommendations:  - advance to regular diet this morning, if tolerating regular diet can be discharged, if not tolerating regular diet will plan for EGD tomorrow  - rest of care per primary team    GI will continue to follow.     Sean Young, PGY5  Gastroenterology/Hepatology Fellow  Available on [employer] Teams  [phone] ([employer] Short Range Pager)  [phone] (Long Range Pager)    After 5pm, please contact the on-call GI fellow. [phone]

## 2021-12-02 ENCOUNTER — NON-APPOINTMENT (OUTPATIENT)
Age: 41
End: 2021-12-02

## 2021-12-07 PROBLEM — K80.20 CALCULUS OF GALLBLADDER WITHOUT CHOLECYSTITIS WITHOUT OBSTRUCTION: Chronic | Status: ACTIVE | Noted: 2021-11-30

## 2021-12-07 PROBLEM — E87.6 HYPOKALEMIA: Chronic | Status: ACTIVE | Noted: 2021-11-30

## 2021-12-13 ENCOUNTER — APPOINTMENT (OUTPATIENT)
Dept: INTERNAL MEDICINE | Facility: CLINIC | Age: 41
End: 2021-12-13
Payer: COMMERCIAL

## 2021-12-13 ENCOUNTER — LABORATORY RESULT (OUTPATIENT)
Age: 41
End: 2021-12-13

## 2021-12-13 VITALS — BODY MASS INDEX: 29.09 KG/M2 | HEIGHT: 72 IN | WEIGHT: 214.8 LBS

## 2021-12-13 VITALS — DIASTOLIC BLOOD PRESSURE: 70 MMHG | SYSTOLIC BLOOD PRESSURE: 120 MMHG

## 2021-12-13 DIAGNOSIS — R10.9 UNSPECIFIED ABDOMINAL PAIN: ICD-10-CM

## 2021-12-13 DIAGNOSIS — R53.83 OTHER FATIGUE: ICD-10-CM

## 2021-12-13 PROCEDURE — 99496 TRANSJ CARE MGMT HIGH F2F 7D: CPT | Mod: 25

## 2021-12-13 RX ORDER — ONDANSETRON 8 MG/1
8 TABLET, ORALLY DISINTEGRATING ORAL
Qty: 20 | Refills: 0 | Status: DISCONTINUED | COMMUNITY
Start: 2021-11-27

## 2021-12-13 NOTE — ASSESSMENT
[FreeTextEntry1] : This is a 41-year-old gentleman who recently was discharged from the hospital with intractable nausea and mild epigastric discomfort.  His complete evaluation was.  The Ozempic was discontinued and presently the patient feels well his blood pressure and physical examination were normal and bloods were obtained

## 2021-12-13 NOTE — REVIEW OF SYSTEMS
[Abdominal Pain] : abdominal pain [Nausea] : nausea [Vomiting] : no vomiting [Heartburn] : no heartburn [Negative] : Musculoskeletal

## 2021-12-13 NOTE — HISTORY OF PRESENT ILLNESS
[de-identified] : This is a 41-year-old patient who was recently discharged from the hospital with epigastric discomfort and intractable nausea.  The patient underwent a CT of the abdomen and pelvis which revealed some mild abnormality in the gallbladder.  A HIDA scan was performed which was normal.  In addition he had an upper endoscopy which was normal.  He was treated with Zofran and IV fluids.  Of note is that the patient was recently started on Ozempic for weight loss and one of the side effects of this medication as a can cause intractable nausea

## 2021-12-14 LAB
25(OH)D3 SERPL-MCNC: 19.9 NG/ML
ALBUMIN SERPL ELPH-MCNC: 4.1 G/DL
ALP BLD-CCNC: 83 U/L
ALT SERPL-CCNC: 17 U/L
AMYLASE/CREAT SERPL: 42 U/L
ANION GAP SERPL CALC-SCNC: 12 MMOL/L
APPEARANCE: CLEAR
AST SERPL-CCNC: 14 U/L
BACTERIA: NEGATIVE
BASOPHILS # BLD AUTO: 0.03 K/UL
BASOPHILS NFR BLD AUTO: 0.4 %
BILIRUB SERPL-MCNC: 0.2 MG/DL
BILIRUBIN URINE: NEGATIVE
BLOOD URINE: NEGATIVE
BUN SERPL-MCNC: 14 MG/DL
CALCIUM SERPL-MCNC: 8.9 MG/DL
CHLORIDE SERPL-SCNC: 106 MMOL/L
CHOLEST SERPL-MCNC: 158 MG/DL
CO2 SERPL-SCNC: 25 MMOL/L
COLOR: YELLOW
CREAT SERPL-MCNC: 0.92 MG/DL
EOSINOPHIL # BLD AUTO: 0.22 K/UL
EOSINOPHIL NFR BLD AUTO: 3.3 %
ESTIMATED AVERAGE GLUCOSE: 105 MG/DL
FERRITIN SERPL-MCNC: 265 NG/ML
FOLATE SERPL-MCNC: 8 NG/ML
GLUCOSE QUALITATIVE U: NEGATIVE
GLUCOSE SERPL-MCNC: 93 MG/DL
HBA1C MFR BLD HPLC: 5.3 %
HCT VFR BLD CALC: 42.8 %
HDLC SERPL-MCNC: 34 MG/DL
HGB BLD-MCNC: 13.5 G/DL
HYALINE CASTS: 1 /LPF
IMM GRANULOCYTES NFR BLD AUTO: 0.1 %
KETONES URINE: NEGATIVE
LDLC SERPL CALC-MCNC: 72 MG/DL
LEUKOCYTE ESTERASE URINE: NEGATIVE
LPL SERPL-CCNC: 24 U/L
LYMPHOCYTES # BLD AUTO: 2.46 K/UL
LYMPHOCYTES NFR BLD AUTO: 36.4 %
MAN DIFF?: NORMAL
MCHC RBC-ENTMCNC: 27.7 PG
MCHC RBC-ENTMCNC: 31.5 GM/DL
MCV RBC AUTO: 87.7 FL
MICROSCOPIC-UA: NORMAL
MONOCYTES # BLD AUTO: 0.54 K/UL
MONOCYTES NFR BLD AUTO: 8 %
NEUTROPHILS # BLD AUTO: 3.49 K/UL
NEUTROPHILS NFR BLD AUTO: 51.8 %
NITRITE URINE: NEGATIVE
NONHDLC SERPL-MCNC: 124 MG/DL
PH URINE: 5.5
PLATELET # BLD AUTO: 264 K/UL
POTASSIUM SERPL-SCNC: 4.1 MMOL/L
PROT SERPL-MCNC: 6.6 G/DL
PROTEIN URINE: NORMAL
RBC # BLD: 4.88 M/UL
RBC # FLD: 13.1 %
RED BLOOD CELLS URINE: 2 /HPF
SODIUM SERPL-SCNC: 143 MMOL/L
SPECIFIC GRAVITY URINE: 1.03
SQUAMOUS EPITHELIAL CELLS: 1 /HPF
T3RU NFR SERPL: 1.1 TBI
T4 SERPL-MCNC: 5.7 UG/DL
TRIGL SERPL-MCNC: 257 MG/DL
TSH SERPL-ACNC: 1.65 UIU/ML
URATE SERPL-MCNC: 6.6 MG/DL
UROBILINOGEN URINE: NORMAL
VIT B12 SERPL-MCNC: 345 PG/ML
WBC # FLD AUTO: 6.75 K/UL
WHITE BLOOD CELLS URINE: 2 /HPF

## 2022-02-20 NOTE — PATIENT PROFILE ADULT - FALL HARM RISK - CONCLUSION
Regular rate and rhythm, Heart sounds S1 S2 present, no murmurs, rubs or gallops Fall with Harm Risk

## 2022-07-26 NOTE — DISCHARGE NOTE NURSING/CASE MANAGEMENT/SOCIAL WORK - DATE OF LAST VACCINATION
08-Nov-2021 Oral Minoxidil Counseling- I discussed with the patient the risks of oral minoxidil including but not limited to shortness of breath, swelling of the feet or ankles, dizziness, lightheadedness, unwanted hair growth and allergic reaction.  The patient verbalized understanding of the proper use and possible adverse effects of oral minoxidil.  All of the patient's questions and concerns were addressed.

## 2022-09-30 ENCOUNTER — LABORATORY RESULT (OUTPATIENT)
Age: 42
End: 2022-09-30

## 2022-09-30 ENCOUNTER — APPOINTMENT (OUTPATIENT)
Dept: INTERNAL MEDICINE | Facility: CLINIC | Age: 42
End: 2022-09-30

## 2022-09-30 ENCOUNTER — TRANSCRIPTION ENCOUNTER (OUTPATIENT)
Age: 42
End: 2022-09-30

## 2022-09-30 ENCOUNTER — NON-APPOINTMENT (OUTPATIENT)
Age: 42
End: 2022-09-30

## 2022-09-30 VITALS — SYSTOLIC BLOOD PRESSURE: 120 MMHG | DIASTOLIC BLOOD PRESSURE: 70 MMHG

## 2022-09-30 PROCEDURE — 93000 ELECTROCARDIOGRAM COMPLETE: CPT | Mod: 59

## 2022-09-30 PROCEDURE — 36415 COLL VENOUS BLD VENIPUNCTURE: CPT

## 2022-09-30 PROCEDURE — 99396 PREV VISIT EST AGE 40-64: CPT | Mod: 25

## 2022-09-30 NOTE — HEALTH RISK ASSESSMENT
HISTORY OF PRESENT ILLNESS Ligia Kelley is a 34 y.o. female. Here for f/u of reactive airway disease post pneumonia last year. Last month started on Breo and coughing resolved. Since stopping, last weekend and yesterday had 2 episodes of severe coughing spells leading to gagging. Except for thiese episodes has felt fine since stopping Breo. Denies rhinorrhea or post nasal drainage. Cough is very dry. Last week occurred after eating lunch, yesterday was at work around 1 pm - right after lunch. Denies heartburn or indigestion issues. Current Outpatient Medications:  
  levothyroxine (SYNTHROID) 50 mcg tablet, Take 1 Tab by mouth Daily (before breakfast). , Disp: 30 Tab, Rfl: 11 
  JUNEL FE 1/20, 28, 1 mg-20 mcg (21)/75 mg (7) tab, TAKE 1 TABLET BY MOUTH EVERY DAY, Disp: , Rfl: 12 /72   Pulse 72   Temp 98.2 °F (36.8 °C) (Oral)   Ht 5' 6\" (1.676 m)   Wt 223 lb (101.2 kg)   SpO2 98%   BMI 35.99 kg/m² ROS See above Physical Exam  
Constitutional: She appears well-developed and well-nourished. HENT:  
Head: Normocephalic and atraumatic. Right Ear: External ear normal.  
Left Ear: External ear normal.  
Nose: Nose normal.  
Mouth/Throat: Oropharynx is clear and moist.  
Neck: Neck supple. Cardiovascular: Normal rate and regular rhythm. Exam reveals no gallop and no friction rub. No murmur heard. Pulmonary/Chest: Effort normal and breath sounds normal.  
Abdominal: Soft. Bowel sounds are normal. She exhibits no distension and no mass. There is no tenderness. Musculoskeletal: She exhibits no edema. Lymphadenopathy:  
  She has no cervical adenopathy. Vitals reviewed. ASSESSMENT and PLAN Reactive airway dz - improved with Br but now with coughing spells again. Will check PFTs. Most of these coughing spells seem to be coming post meals. ? Silent reflux. Start Omeprazole 20mg every day. Discussed GERD diet. F/u 1 month. [Never] : Never [No] : No [No falls in past year] : Patient reported no falls in the past year [0] : 2) Feeling down, depressed, or hopeless: Not at all (0) [FET3Oardo] : 0

## 2022-09-30 NOTE — REVIEW OF SYSTEMS
[Abdominal Pain] : no abdominal pain [Nausea] : no nausea [Vomiting] : no vomiting [Heartburn] : no heartburn [Negative] : Heme/Lymph

## 2022-09-30 NOTE — PLAN
[FreeTextEntry1] : This is a 42-year-old gentleman with a history of fatty liver disease and obesity who is here for his annual well examination.  His blood pressure and physical examination are normal except for his weight.  I spoke to the patient at length about the importance of fatty liver disease that can lead to cirrhosis and increases the risk of hepatoma.  I referred the patient to weight watchers advised him to exercise and told him to avoid alcohol, NSAIDs and I referred him to hepatologist

## 2022-09-30 NOTE — HISTORY OF PRESENT ILLNESS
[de-identified] : This is a 42-year-old gentleman with a known history of fatty liver disease who is here today for his annual well examination.  The patient feels well aside from his obesity.

## 2022-09-30 NOTE — PHYSICAL EXAM
[No Acute Distress] : no acute distress [Well Nourished] : well nourished [Well Developed] : well developed [Well-Appearing] : well-appearing [Normal Sclera/Conjunctiva] : normal sclera/conjunctiva [PERRL] : pupils equal round and reactive to light [EOMI] : extraocular movements intact [Normal Outer Ear/Nose] : the outer ears and nose were normal in appearance [Normal Oropharynx] : the oropharynx was normal [No JVD] : no jugular venous distention [No Lymphadenopathy] : no lymphadenopathy [Supple] : supple [Thyroid Normal, No Nodules] : the thyroid was normal and there were no nodules present [No Respiratory Distress] : no respiratory distress  [No Accessory Muscle Use] : no accessory muscle use [Clear to Auscultation] : lungs were clear to auscultation bilaterally [Normal Rate] : normal rate  [Regular Rhythm] : with a regular rhythm [Normal S1, S2] : normal S1 and S2 [No Murmur] : no murmur heard [No Carotid Bruits] : no carotid bruits [No Abdominal Bruit] : a ~M bruit was not heard ~T in the abdomen [No Varicosities] : no varicosities [Pedal Pulses Present] : the pedal pulses are present [No Edema] : there was no peripheral edema [No Palpable Aorta] : no palpable aorta [No Extremity Clubbing/Cyanosis] : no extremity clubbing/cyanosis [Soft] : abdomen soft [Non Tender] : non-tender [Non-distended] : non-distended [No Masses] : no abdominal mass palpated [No HSM] : no HSM [Normal Bowel Sounds] : normal bowel sounds [Scrotum] : the scrotum was normal [Epididymis] : the epididymides were normal [Testes Tenderness] : no tenderness of the testes [Normal Posterior Cervical Nodes] : no posterior cervical lymphadenopathy [Normal Anterior Cervical Nodes] : no anterior cervical lymphadenopathy [No CVA Tenderness] : no CVA  tenderness [No Spinal Tenderness] : no spinal tenderness [No Joint Swelling] : no joint swelling [Grossly Normal Strength/Tone] : grossly normal strength/tone [No Rash] : no rash [Coordination Grossly Intact] : coordination grossly intact [No Focal Deficits] : no focal deficits [Normal Gait] : normal gait [Deep Tendon Reflexes (DTR)] : deep tendon reflexes were 2+ and symmetric [Normal Affect] : the affect was normal [Normal Insight/Judgement] : insight and judgment were intact

## 2022-10-01 LAB
ALBUMIN SERPL ELPH-MCNC: 4.5 G/DL
ALP BLD-CCNC: 72 U/L
ALT SERPL-CCNC: 41 U/L
ANION GAP SERPL CALC-SCNC: 13 MMOL/L
APPEARANCE: CLEAR
AST SERPL-CCNC: 25 U/L
BACTERIA: NEGATIVE
BASOPHILS # BLD AUTO: 0.04 K/UL
BASOPHILS NFR BLD AUTO: 0.5 %
BILIRUB SERPL-MCNC: 0.3 MG/DL
BILIRUBIN URINE: NEGATIVE
BLOOD URINE: NEGATIVE
BUN SERPL-MCNC: 13 MG/DL
CALCIUM SERPL-MCNC: 9.4 MG/DL
CHLORIDE SERPL-SCNC: 104 MMOL/L
CHOLEST SERPL-MCNC: 199 MG/DL
CO2 SERPL-SCNC: 24 MMOL/L
COLOR: YELLOW
CREAT SERPL-MCNC: 0.83 MG/DL
EGFR: 113 ML/MIN/1.73M2
EOSINOPHIL # BLD AUTO: 0.09 K/UL
EOSINOPHIL NFR BLD AUTO: 1.2 %
ESTIMATED AVERAGE GLUCOSE: 105 MG/DL
FERRITIN SERPL-MCNC: 241 NG/ML
FOLATE SERPL-MCNC: 16.5 NG/ML
GGT SERPL-CCNC: 13 U/L
GLUCOSE QUALITATIVE U: NEGATIVE
GLUCOSE SERPL-MCNC: 78 MG/DL
HBA1C MFR BLD HPLC: 5.3 %
HCT VFR BLD CALC: 44.1 %
HDLC SERPL-MCNC: 40 MG/DL
HGB BLD-MCNC: 14.5 G/DL
HYALINE CASTS: 1 /LPF
IMM GRANULOCYTES NFR BLD AUTO: 0.1 %
KETONES URINE: NEGATIVE
LDLC SERPL CALC-MCNC: 103 MG/DL
LEUKOCYTE ESTERASE URINE: NEGATIVE
LYMPHOCYTES # BLD AUTO: 3.77 K/UL
LYMPHOCYTES NFR BLD AUTO: 50.1 %
MAN DIFF?: NORMAL
MCHC RBC-ENTMCNC: 28.9 PG
MCHC RBC-ENTMCNC: 32.9 GM/DL
MCV RBC AUTO: 87.8 FL
MICROSCOPIC-UA: NORMAL
MONOCYTES # BLD AUTO: 0.63 K/UL
MONOCYTES NFR BLD AUTO: 8.4 %
NEUTROPHILS # BLD AUTO: 2.99 K/UL
NEUTROPHILS NFR BLD AUTO: 39.7 %
NITRITE URINE: NEGATIVE
NONHDLC SERPL-MCNC: 159 MG/DL
PH URINE: 6
PLATELET # BLD AUTO: 243 K/UL
POTASSIUM SERPL-SCNC: 4.4 MMOL/L
PROT SERPL-MCNC: 7.1 G/DL
PROTEIN URINE: NEGATIVE
RBC # BLD: 5.02 M/UL
RBC # FLD: 12.4 %
RED BLOOD CELLS URINE: 1 /HPF
SODIUM SERPL-SCNC: 141 MMOL/L
SPECIFIC GRAVITY URINE: 1.03
SQUAMOUS EPITHELIAL CELLS: 1 /HPF
T3RU NFR SERPL: 1.1 TBI
T4 SERPL-MCNC: 6.7 UG/DL
TRIGL SERPL-MCNC: 279 MG/DL
TSH SERPL-ACNC: 1.96 UIU/ML
URATE SERPL-MCNC: 5.9 MG/DL
UROBILINOGEN URINE: NORMAL
VIT B12 SERPL-MCNC: 410 PG/ML
WBC # FLD AUTO: 7.53 K/UL
WHITE BLOOD CELLS URINE: 2 /HPF

## 2022-10-13 DIAGNOSIS — B33.8 OTHER SPECIFIED VIRAL DISEASES: ICD-10-CM

## 2022-10-18 ENCOUNTER — APPOINTMENT (OUTPATIENT)
Dept: INTERNAL MEDICINE | Facility: CLINIC | Age: 42
End: 2022-10-18

## 2022-10-18 LAB
BASOPHILS # BLD AUTO: 0.03 K/UL
BASOPHILS NFR BLD AUTO: 0.4 %
EOSINOPHIL # BLD AUTO: 0.11 K/UL
EOSINOPHIL NFR BLD AUTO: 1.4 %
HCT VFR BLD CALC: 45.1 %
HGB BLD-MCNC: 14.9 G/DL
IMM GRANULOCYTES NFR BLD AUTO: 0.1 %
LYMPHOCYTES # BLD AUTO: 3.72 K/UL
LYMPHOCYTES NFR BLD AUTO: 48.2 %
MAN DIFF?: NORMAL
MCHC RBC-ENTMCNC: 28.9 PG
MCHC RBC-ENTMCNC: 33 GM/DL
MCV RBC AUTO: 87.6 FL
MONOCYTES # BLD AUTO: 0.68 K/UL
MONOCYTES NFR BLD AUTO: 8.8 %
NEUTROPHILS # BLD AUTO: 3.17 K/UL
NEUTROPHILS NFR BLD AUTO: 41.1 %
PLATELET # BLD AUTO: 265 K/UL
RBC # BLD: 5.15 M/UL
RBC # FLD: 12.6 %
WBC # FLD AUTO: 7.72 K/UL

## 2022-10-18 PROCEDURE — 36415 COLL VENOUS BLD VENIPUNCTURE: CPT

## 2022-10-19 DIAGNOSIS — D72.820 LYMPHOCYTOSIS (SYMPTOMATIC): ICD-10-CM

## 2022-10-21 ENCOUNTER — LABORATORY RESULT (OUTPATIENT)
Age: 42
End: 2022-10-21

## 2022-10-24 LAB
ALBUMIN SERPL ELPH-MCNC: 4.7 G/DL
ALP BLD-CCNC: 83 U/L
ALT SERPL-CCNC: 63 U/L
ANA PAT FLD IF-IMP: ABNORMAL
ANA SER IF-ACNC: ABNORMAL
ANION GAP SERPL CALC-SCNC: 16 MMOL/L
AST SERPL-CCNC: 33 U/L
B2 MICROGLOB SERPL-MCNC: 2.1 MG/L
BILIRUB SERPL-MCNC: 0.4 MG/DL
BUN SERPL-MCNC: 16 MG/DL
CALCIUM SERPL-MCNC: 9.4 MG/DL
CHLORIDE SERPL-SCNC: 103 MMOL/L
CO2 SERPL-SCNC: 24 MMOL/L
CREAT SERPL-MCNC: 0.92 MG/DL
EGFR: 108 ML/MIN/1.73M2
GLUCOSE SERPL-MCNC: 73 MG/DL
HBV CORE IGG+IGM SER QL: NONREACTIVE
HBV SURFACE AB SER QL: NONREACTIVE
HBV SURFACE AG SER QL: NONREACTIVE
HCV AB SER QL: NONREACTIVE
HCV S/CO RATIO: 0.22 S/CO
LDH SERPL-CCNC: 206 U/L
MAGNESIUM SERPL-MCNC: 2.2 MG/DL
PHOSPHATE SERPL-MCNC: 3.7 MG/DL
POTASSIUM SERPL-SCNC: 4.2 MMOL/L
PROT SERPL-MCNC: 7.6 G/DL
SODIUM SERPL-SCNC: 143 MMOL/L
URATE SERPL-MCNC: 7.1 MG/DL

## 2022-10-26 ENCOUNTER — NON-APPOINTMENT (OUTPATIENT)
Age: 42
End: 2022-10-26

## 2022-10-26 ENCOUNTER — APPOINTMENT (OUTPATIENT)
Dept: HEMATOLOGY ONCOLOGY | Facility: CLINIC | Age: 42
End: 2022-10-26

## 2022-10-26 VITALS
BODY MASS INDEX: 33.16 KG/M2 | TEMPERATURE: 98.2 F | OXYGEN SATURATION: 97 % | HEART RATE: 70 BPM | RESPIRATION RATE: 14 BRPM | WEIGHT: 244.5 LBS

## 2022-10-26 DIAGNOSIS — Z20.822 CONTACT WITH AND (SUSPECTED) EXPOSURE TO COVID-19: ICD-10-CM

## 2022-10-26 DIAGNOSIS — Z87.2 PERSONAL HISTORY OF DISEASES OF THE SKIN AND SUBCUTANEOUS TISSUE: ICD-10-CM

## 2022-10-26 DIAGNOSIS — Z02.82 ENCOUNTER FOR ADOPTION SERVICES: ICD-10-CM

## 2022-10-26 DIAGNOSIS — L02.226: ICD-10-CM

## 2022-10-26 DIAGNOSIS — R21 RASH AND OTHER NONSPECIFIC SKIN ERUPTION: ICD-10-CM

## 2022-10-26 DIAGNOSIS — Z78.9 OTHER SPECIFIED HEALTH STATUS: ICD-10-CM

## 2022-10-26 DIAGNOSIS — Z87.09 PERSONAL HISTORY OF OTHER DISEASES OF THE RESPIRATORY SYSTEM: ICD-10-CM

## 2022-10-26 LAB
BASOPHILS # BLD AUTO: 0.04 K/UL
BASOPHILS NFR BLD AUTO: 0.4 %
EOSINOPHIL # BLD AUTO: 0.13 K/UL
EOSINOPHIL NFR BLD AUTO: 1.4 %
HCT VFR BLD CALC: 45.8 %
HGB BLD-MCNC: 15.1 G/DL
IMM GRANULOCYTES NFR BLD AUTO: 0.2 %
LYMPHOCYTES # BLD AUTO: 4.65 K/UL
LYMPHOCYTES NFR BLD AUTO: 49.2 %
MAN DIFF?: NORMAL
MCHC RBC-ENTMCNC: 28.7 PG
MCHC RBC-ENTMCNC: 33 GM/DL
MCV RBC AUTO: 87.1 FL
MONOCYTES # BLD AUTO: 0.69 K/UL
MONOCYTES NFR BLD AUTO: 7.3 %
NEUTROPHILS # BLD AUTO: 3.92 K/UL
NEUTROPHILS NFR BLD AUTO: 41.5 %
PLATELET # BLD AUTO: 271 K/UL
RBC # BLD: 5.26 M/UL
RBC # FLD: 12.4 %
WBC # FLD AUTO: 9.45 K/UL

## 2022-10-26 PROCEDURE — 99203 OFFICE O/P NEW LOW 30 MIN: CPT

## 2022-10-26 RX ORDER — DIAZEPAM 5 MG/1
5 TABLET ORAL EVERY 6 HOURS
Qty: 10 | Refills: 0 | Status: DISCONTINUED | COMMUNITY
Start: 2018-07-19 | End: 2022-10-26

## 2022-10-26 RX ORDER — OLOPATADINE HYDROCHLORIDE 2 MG/ML
0.2 SOLUTION OPHTHALMIC
Qty: 2 | Refills: 3 | Status: DISCONTINUED | COMMUNITY
Start: 2018-08-06 | End: 2022-10-26

## 2022-10-26 RX ORDER — MUPIROCIN 2 G/100G
2 CREAM TOPICAL TWICE DAILY
Qty: 1 | Refills: 2 | Status: DISCONTINUED | COMMUNITY
Start: 2020-05-12 | End: 2022-10-26

## 2022-10-26 RX ORDER — LEVOCETIRIZINE DIHYDROCHLORIDE 5 MG/1
5 TABLET ORAL DAILY
Qty: 30 | Refills: 2 | Status: DISCONTINUED | COMMUNITY
Start: 2018-05-14 | End: 2022-10-26

## 2022-10-26 RX ORDER — CEPHALEXIN 500 MG/1
500 CAPSULE ORAL 4 TIMES DAILY
Qty: 28 | Refills: 0 | Status: DISCONTINUED | COMMUNITY
Start: 2022-02-14 | End: 2022-10-26

## 2022-10-26 RX ORDER — CLOBETASOL PROPIONATE 0.5 MG/G
0.05 CREAM TOPICAL TWICE DAILY
Qty: 1 | Refills: 3 | Status: DISCONTINUED | COMMUNITY
Start: 2020-08-07 | End: 2022-10-26

## 2022-10-27 NOTE — RESULTS/DATA
[FreeTextEntry1] : 10/21/22 CBC:\par WBC 9.45\par ALC 4.65\par ANC 3.92\par Hb 15.1\par Hct 45.8\par Plt 271

## 2022-10-27 NOTE — ASSESSMENT
[FreeTextEntry1] : 43 yo M with a PMH of ADD (controlled off medications), hepatic steatosis, and obesity who presents for consultation for incidentally found lymphocytosis.\par \par #Reactive Lymphocytosis\par - Incidentally seen on routine blood work, from 9/30/22, and subsequent blood work, not previously seen in 2021, and with normal WBC\par - Asymptomatic, no LAD on exam\par - Flow cytometry 10/21/22 unremarkable- no evidence of a monoclonal process \par - Therefore, no concern for clonality such as in MBL or CLL at this time\par - Patient may have had recent EBV re-activation based on titers (although IgM equivocal, history is also suspicious given high risk of exposure with children)\par - Obesity may also increase risk of lymphocytosis\par - Could consider EBV PCR if persists, remains asymptomatic \par - Could consider rheum f/u for minimally elevated RG 1:80 if pt chooses, he currently has no symptoms \par - Otherwise, repeat CBC +/- flow cytometry in 3-6 months\par \par Patient seen with and plan discussed with Dr. Lopez.\par \par Aryles Hedjar, MD, PGY-5\par Hematology/Oncology Fellow\par [facility]

## 2022-10-27 NOTE — HISTORY OF PRESENT ILLNESS
[de-identified] : 41 yo M with a PMH of ADD (controlled off medications), hepatic steatosis, and obesity who presents for consultation for lymphocytosis.\par He has been followed by his PCP, and starting from late September 2022 on a routine physical, has had had lymphocytosis with a normal WBC count. Lymphocyte count was normal in prior labs from 12/2021.\par He denies symptoms, including weight loss, fevers, chills, night sweats, cough, rhinorrhea, stuffed nose, headache, N/V, diarrhea, rash, swelling, lumps, or bumps.\par He does have 3 children, age 4, 2, and 1, and they get frequent infections.\par \par He is adopted.\par Social alcohol use. NO smoking or drug use.\par Works as an  at [employer].\par \par Flow cytometry 10/21/22 shows:\par DIAGNOSIS:\par Peripheral blood:\par      - The lymphocyte immunophenotypic findings show no diagnostic abnormalities.\par      - The myeloid immunophenotypic findings show normal myeloid granularity, no increase in myeloid immaturity, and normal myeloid antigen maturation pattern.\par \par INTERPRETATION:\par MORPHOLOGY: Blood smear shows mildly increased lymphocytes.\par \par IMMUNOPHENOTYPE:\par Lymphocytes (43% of cells): Heterogeneous population of T-cells (with normal CD4 to CD8 ratio, including slightly increased proportion of natural killer-like T-cells (9% of total cells) with multiple subsets and no significant antigen aberrancy), natural killer cells, and polytypic B-cells.\par \par CD45/side scatter shows no blast population and normal myeloid granularity. There is no increase in CD34, CD14/CD64 or  positive cells. Myeloid antigen pattern is normal with CD13, CD16, CD11b, CD15, and CD33.

## 2022-10-27 NOTE — END OF VISIT
[FreeTextEntry3] : Patient seen and case discussed with Dr. Oliveira. 43 yo male with asymptomatic reactive lymphosis, likely infectious or inflammatory. Can continue to trend q 3-6 months as needed.  [Time Spent: ___ minutes] : I have spent [unfilled] minutes of time on the encounter.

## 2022-10-28 LAB
ALBUMIN MFR SERPL ELPH: 60.3 %
ALBUMIN SERPL-MCNC: 4.6 G/DL
ALBUMIN/GLOB SERPL: 1.5 RATIO
ALPHA1 GLOB MFR SERPL ELPH: 3.3 %
ALPHA1 GLOB SERPL ELPH-MCNC: 0.3 G/DL
ALPHA2 GLOB MFR SERPL ELPH: 7.2 %
ALPHA2 GLOB SERPL ELPH-MCNC: 0.5 G/DL
B-GLOBULIN MFR SERPL ELPH: 12.3 %
B-GLOBULIN SERPL ELPH-MCNC: 0.9 G/DL
DEPRECATED KAPPA LC FREE/LAMBDA SER: 1.88 RATIO
GAMMA GLOB FLD ELPH-MCNC: 1.3 G/DL
GAMMA GLOB MFR SERPL ELPH: 16.9 %
IGA SER QL IEP: 265 MG/DL
IGG SER QL IEP: 1351 MG/DL
IGM SER QL IEP: 57 MG/DL
INTERPRETATION SERPL IEP-IMP: NORMAL
KAPPA LC CSF-MCNC: 1.7 MG/DL
KAPPA LC SERPL-MCNC: 3.2 MG/DL
M PROTEIN SPEC IFE-MCNC: NORMAL
PROT SERPL-MCNC: 7.6 G/DL
PROT SERPL-MCNC: 7.6 G/DL

## 2023-03-01 ENCOUNTER — APPOINTMENT (OUTPATIENT)
Dept: HEPATOLOGY | Facility: CLINIC | Age: 43
End: 2023-03-01

## 2023-03-20 ENCOUNTER — APPOINTMENT (OUTPATIENT)
Dept: HEPATOLOGY | Facility: CLINIC | Age: 43
End: 2023-03-20

## 2023-03-30 ENCOUNTER — LABORATORY RESULT (OUTPATIENT)
Age: 43
End: 2023-03-30

## 2023-03-30 ENCOUNTER — APPOINTMENT (OUTPATIENT)
Dept: HEPATOLOGY | Facility: CLINIC | Age: 43
End: 2023-03-30
Payer: COMMERCIAL

## 2023-03-30 VITALS
TEMPERATURE: 98 F | RESPIRATION RATE: 14 BRPM | DIASTOLIC BLOOD PRESSURE: 82 MMHG | HEIGHT: 72 IN | OXYGEN SATURATION: 97 % | BODY MASS INDEX: 34.2 KG/M2 | HEART RATE: 74 BPM | SYSTOLIC BLOOD PRESSURE: 117 MMHG | WEIGHT: 252.5 LBS

## 2023-03-30 DIAGNOSIS — E66.9 OBESITY, UNSPECIFIED: ICD-10-CM

## 2023-03-30 PROCEDURE — 99214 OFFICE O/P EST MOD 30 MIN: CPT

## 2023-03-30 PROCEDURE — 99204 OFFICE O/P NEW MOD 45 MIN: CPT

## 2023-03-30 RX ORDER — PANTOPRAZOLE 40 MG/1
40 TABLET, DELAYED RELEASE ORAL
Qty: 1 | Refills: 3 | Status: DISCONTINUED | COMMUNITY
Start: 2021-12-02 | End: 2023-03-30

## 2023-03-30 NOTE — HISTORY OF PRESENT ILLNESS
[de-identified] : Shahid Perry 42 yo M w/ PMhx ADHD and obesity here for evaluation of elevated liver enzymes and fatty liver. In Nov 2021 he was admitted at [facility] with vomiting and was found to have fatty liver on US. Reviewed of his chart showed elevated ALT 52 in 2016 and 63 in Oct 2022. Had prediabetes in the past.  A1c normalized when he was taking Ozempic for weight management. Lost about 35 lbs on Ozempic but has been off for few months due to supply issues. \par \par His current weight is 252 lbs, BMI 34. States that his diet is poor. No exercise but active due to having 3 children. He only drinks alcohol socially, about 1-2 drinks a month when out for dinner. \par No family h/o liver disease.\par Med: Taking Ritalin PRN. No OTC supplement or herbals. \par \par BW 10/21/22 ALT 63, AST 33, ALP 83, TB 0.4\par Abdo US 11/29/21 moderate severity diffuse hepatic steatosis\par BW HBsAg neg, HBcAb neg, HBsAb neg, HCV Ab neg

## 2023-03-30 NOTE — ASSESSMENT
[FreeTextEntry1] : Shahid Perry 40 yo M w/ PMhx ADHD and obesity here for evaluation of elevated liver enzymes and fatty liver. \par \par # Elevated liver enzymes\par - Mildly elevated ALT started in 2016 with periods of normalization likely secondary to fatty liver seen on imaging in 2021. Denies over the counter herbal remedies, dietary supplements, or teas or homeopathic medications. No significant alcohol use. Pattern of injury typical of fatty liver however BW ordered to evaluated for other CLD.\par -Fibroscan test ordered to evaluate for fibrosis. \par \par #NAFLD\par -No significant alcohol use. Seen on Abdo US. Risks are prediabetes in the past and BMI >30. \par -Explained no current FDA approved treatment. We also discussed lifestyle modifications for management of NAFLD. I recommended gradual weight loss of 5-10% of his body weight. I recommended increased consumption of vegetables and lean proteins, avoidance of red meat and high fructose corn syrup, and avoidance of calorie-containing beverages. I recommended moderate intensity exercise for a minimum of 20-30 minutes at least 3 times per week. These changes have been shown to lead to regression or even resolution of steatosis, inflammation, and even fibrosis in some patients. \par -Had prediabetes in the past.  A1c normalized when he was taking Ozempic for weight management. Lost about 35 lbs on Ozempic but has been off for few months due to supply issues. Recommended restarting Ozempic,  I discussed that semaglutide helps to improve diabetes type 2, obesity, and RUIZ. \par \par # Health maintenance\par -Non immune to hep B, recommended vaccination. Will check HAV serologies and offer vaccination if non-immune.\par \par Plan:\par BW today, Fibroscan test and F/U in 2 weeks\par

## 2023-03-30 NOTE — PHYSICAL EXAM
[Scleral Icterus] : No Scleral Icterus [Abdominal  Ascites] : no ascites [Asterixis] : no asterixis observed [Jaundice] : No jaundice [Palmar Erythema] : no Palmar Erythema [General Appearance - Alert] : alert [General Appearance - In No Acute Distress] : in no acute distress [Sclera] : the sclera and conjunctiva were normal [Respiration, Rhythm And Depth] : normal respiratory rhythm and effort [Auscultation Breath Sounds / Voice Sounds] : lungs were clear to auscultation bilaterally [Apical Impulse] : the apical impulse was normal [Heart Rate And Rhythm] : heart rate was normal and rhythm regular [Heart Sounds] : normal S1 and S2 [Edema] : there was no peripheral edema [Abdomen Soft] : soft [Abdomen Tenderness] : non-tender [] : no hepato-splenomegaly [Oriented To Time, Place, And Person] : oriented to person, place, and time [Affect] : the affect was normal

## 2023-03-31 LAB
A1AT SERPL-MCNC: 140 MG/DL
ALBUMIN SERPL ELPH-MCNC: 4.6 G/DL
ALP BLD-CCNC: 87 U/L
ALT SERPL-CCNC: 48 U/L
ANION GAP SERPL CALC-SCNC: 14 MMOL/L
AST SERPL-CCNC: 25 U/L
BASOPHILS # BLD AUTO: 0.03 K/UL
BASOPHILS NFR BLD AUTO: 0.4 %
BILIRUB DIRECT SERPL-MCNC: 0.1 MG/DL
BILIRUB SERPL-MCNC: 0.3 MG/DL
BUN SERPL-MCNC: 11 MG/DL
CALCIUM SERPL-MCNC: 9.5 MG/DL
CERULOPLASMIN SERPL-MCNC: 24 MG/DL
CHLORIDE SERPL-SCNC: 103 MMOL/L
CO2 SERPL-SCNC: 25 MMOL/L
CREAT SERPL-MCNC: 0.81 MG/DL
EGFR: 114 ML/MIN/1.73M2
EOSINOPHIL # BLD AUTO: 0.14 K/UL
EOSINOPHIL NFR BLD AUTO: 1.8 %
FERRITIN SERPL-MCNC: 291 NG/ML
GGT SERPL-CCNC: 13 U/L
HCT VFR BLD CALC: 45.7 %
HEPATITIS A IGG ANTIBODY: REACTIVE
HGB BLD-MCNC: 14.9 G/DL
IGA SER QL IEP: 297 MG/DL
IGG SER QL IEP: 1506 MG/DL
IGM SER QL IEP: 54 MG/DL
IMM GRANULOCYTES NFR BLD AUTO: 0.1 %
INR PPP: 0.95 RATIO
IRON SATN MFR SERPL: 39 %
IRON SERPL-MCNC: 121 UG/DL
LYMPHOCYTES # BLD AUTO: 3.39 K/UL
LYMPHOCYTES NFR BLD AUTO: 44.2 %
MAN DIFF?: NORMAL
MCHC RBC-ENTMCNC: 28.3 PG
MCHC RBC-ENTMCNC: 32.6 GM/DL
MCV RBC AUTO: 86.7 FL
MONOCYTES # BLD AUTO: 0.56 K/UL
MONOCYTES NFR BLD AUTO: 7.3 %
NEUTROPHILS # BLD AUTO: 3.54 K/UL
NEUTROPHILS NFR BLD AUTO: 46.2 %
PLATELET # BLD AUTO: 287 K/UL
POTASSIUM SERPL-SCNC: 4.5 MMOL/L
PROT SERPL-MCNC: 7.6 G/DL
PT BLD: 11.1 SEC
RBC # BLD: 5.27 M/UL
RBC # FLD: 13.2 %
SODIUM SERPL-SCNC: 142 MMOL/L
TIBC SERPL-MCNC: 313 UG/DL
UIBC SERPL-MCNC: 192 UG/DL
WBC # FLD AUTO: 7.67 K/UL

## 2023-04-03 LAB
ANA SER IF-ACNC: NEGATIVE
LKM AB SER QL IF: <20.1 UNITS

## 2023-04-04 LAB
MITOCHONDRIA AB SER IF-ACNC: NORMAL
SMOOTH MUSCLE AB SER QL IF: NORMAL

## 2023-04-05 LAB — SOLUBLE LIVER IGG SER IA-ACNC: 1.2

## 2023-04-07 ENCOUNTER — APPOINTMENT (OUTPATIENT)
Dept: HEPATOLOGY | Facility: CLINIC | Age: 43
End: 2023-04-07
Payer: COMMERCIAL

## 2023-04-07 PROCEDURE — 76981 USE PARENCHYMA: CPT

## 2023-04-20 ENCOUNTER — APPOINTMENT (OUTPATIENT)
Dept: HEPATOLOGY | Facility: CLINIC | Age: 43
End: 2023-04-20
Payer: COMMERCIAL

## 2023-04-20 VITALS
RESPIRATION RATE: 15 BRPM | HEIGHT: 72 IN | BODY MASS INDEX: 33.05 KG/M2 | DIASTOLIC BLOOD PRESSURE: 81 MMHG | OXYGEN SATURATION: 97 % | HEART RATE: 70 BPM | WEIGHT: 244 LBS | SYSTOLIC BLOOD PRESSURE: 125 MMHG | TEMPERATURE: 98 F

## 2023-04-20 DIAGNOSIS — K76.0 FATTY (CHANGE OF) LIVER, NOT ELSEWHERE CLASSIFIED: ICD-10-CM

## 2023-04-20 DIAGNOSIS — R74.8 ABNORMAL LEVELS OF OTHER SERUM ENZYMES: ICD-10-CM

## 2023-04-20 PROCEDURE — 99214 OFFICE O/P EST MOD 30 MIN: CPT

## 2023-04-20 NOTE — HISTORY OF PRESENT ILLNESS
[de-identified] : Shahid Perry 42 yo M w/ PMhx ADHD and obesity here to review recent liver evaluation for elevated liver enzymes and fatty liver. Feels well, no new complaints. Has made dietary modifications since his last visit, lost 8 lbs. Fibroscan test on 4/7/23 showed F0-1 (5.8 kPa), S3 (). \par \par 3/30/23 Initial visit: here for evaluation of elevated liver enzymes and fatty liver. In Nov 2021 he was admitted at [facility] with vomiting and was found to have fatty liver on US. Reviewed of his chart showed elevated ALT 52 in 2016 and 63 in Oct 2022. Had prediabetes in the past.  A1c normalized when he was taking Ozempic for weight management. Lost about 35 lbs on Ozempic but has been off for few months due to supply issues. \par \par His current weight is 252 lbs, BMI 34. States that his diet is poor. No exercise but active due to having 3 children. He only drinks alcohol socially, about 1-2 drinks a month when out for dinner. \par No family h/o liver disease.\par Med: Taking Ritalin PRN. No OTC supplement or herbals. \par \par BW 10/21/22 ALT 63, AST 33, ALP 83, TB 0.4\par Abdo US 11/29/21 moderate severity diffuse hepatic steatosis\par BW HBsAg neg, HBcAb neg, HBsAb neg, HCV Ab neg

## 2023-04-20 NOTE — ASSESSMENT
[FreeTextEntry1] : Shahid Perry 42 yo M w/ PMhx ADHD and obesity here for evaluation of elevated liver enzymes and fatty liver. \par \par # Elevated liver enzymes\par - Mildly elevated ALT started in 2016 with periods of normalization likely secondary to fatty liver. Denies over the counter herbal remedies, dietary supplements, or teas or homeopathic medications. No significant alcohol use.  Laboratory workup was neg for viral hepatitis, immune mediated liver disease and hereditary liver disease. Pattern of injury typical of fatty liver. Fibroscan test on 4/7/23 showed F0-1 (5.8 kPa), S3 (). \par \par #NAFLD\par -No significant alcohol use. Seen on Abdo US and fibroscan test showed severe steatosis without fibrosis. \par -Explained no current FDA approved treatment. We also discussed lifestyle modifications for management. I recommended gradual weight loss of 5-10% of his body weight. I recommended increased consumption of vegetables and lean proteins, avoidance of red meat and high fructose corn syrup, and avoidance of calorie-containing beverages. I recommended moderate intensity exercise for a minimum of 20-30 minutes at least 3 times per week. These changes have been shown to lead to regression or even resolution of steatosis, inflammation, and even fibrosis in some patients. \par -Had prediabetes in the past.  A1c normalized when he was taking Ozempic for weight management. Lost about 35 lbs on Ozempic but has been off for few months due to supply issues. Recommended restarting Ozempic,  I discussed that semaglutide helps to improve diabetes type 2, obesity, and RUIZ. \par \par # Health maintenance\par -Hep A immune.\par Non immune to hep B, recommended vaccination. \par \par Plan:\par RTC 6 months with repeat BW\par

## 2023-08-22 NOTE — ED PROVIDER NOTE - CPE EDP NEURO NORM
normal... Minocycline Counseling: Patient advised regarding possible photosensitivity and discoloration of the teeth, skin, lips, tongue and gums.  Patient instructed to avoid sunlight, if possible.  When exposed to sunlight, patients should wear protective clothing, sunglasses, and sunscreen.  The patient was instructed to call the office immediately if the following severe adverse effects occur:  hearing changes, easy bruising/bleeding, severe headache, or vision changes.  The patient verbalized understanding of the proper use and possible adverse effects of minocycline.  All of the patient's questions and concerns were addressed.

## 2023-10-19 ENCOUNTER — APPOINTMENT (OUTPATIENT)
Dept: HEPATOLOGY | Facility: CLINIC | Age: 43
End: 2023-10-19

## 2023-10-23 ENCOUNTER — APPOINTMENT (OUTPATIENT)
Dept: HEPATOLOGY | Facility: CLINIC | Age: 43
End: 2023-10-23

## 2024-01-26 NOTE — PATIENT PROFILE ADULT - FUNCTIONAL ASSESSMENT - DAILY ACTIVITY ASSESSMENT TYPE
Spinal Block    Patient location during procedure: OR  Start time: 1/26/2024 6:53 AM  End time: 1/26/2024 6:54 AM  Reason for block: primary anesthetic  Staffing  Performed: CRNA and CAA   Authorized by: Clay Gaffney MD PhD    Performed by: JESSICA Gonzalez-CRNA    Preanesthetic Checklist  Completed: patient identified, IV checked, risks and benefits discussed, surgical consent, pre-op evaluation, timeout performed and sterile techniques followed  Block Timeout  RN/Licensed healthcare professional reads aloud to the Anesthesia provider and entire team: Patient identity, procedure with side and site, patient position, and as applicable the availability of implants/special equipment/special requirements.  Patient on coagulant treatment: no  Timeout performed at: 1/26/2024 6:53 AM  Spinal Block  Patient position: sitting  Prep: ChloraPrep  Sterility prep: drape, gloves, cap and mask  Sedation level: no sedation  Patient monitoring: blood pressure and continuous pulse oximetry  Approach: midline  Vertebral space: L3-4  Injection technique: single-shot  Needle  Needle type: pencil-point   Needle gauge: 25 G  Free flowing CSF: yes    Assessment  Sensory level: T6 bilateral  Block outcome: patient comfortable  Procedure assessment: patient tolerated procedure well with no immediate complications           Admission

## 2024-03-04 DIAGNOSIS — J01.80 OTHER ACUTE SINUSITIS: ICD-10-CM

## 2024-03-04 RX ORDER — AZITHROMYCIN 250 MG/1
250 TABLET, FILM COATED ORAL
Qty: 1 | Refills: 0 | Status: ACTIVE | COMMUNITY
Start: 2024-03-04 | End: 1900-01-01

## 2024-03-28 ENCOUNTER — LABORATORY RESULT (OUTPATIENT)
Age: 44
End: 2024-03-28

## 2024-03-28 DIAGNOSIS — N41.9 INFLAMMATORY DISEASE OF PROSTATE, UNSPECIFIED: ICD-10-CM

## 2024-03-28 RX ORDER — DOXYCYCLINE HYCLATE 100 MG/1
100 CAPSULE ORAL
Qty: 20 | Refills: 0 | Status: ACTIVE | COMMUNITY
Start: 2024-03-28 | End: 1900-01-01

## 2024-03-29 ENCOUNTER — NON-APPOINTMENT (OUTPATIENT)
Age: 44
End: 2024-03-29

## 2024-03-29 LAB
APPEARANCE: CLEAR
BILIRUBIN URINE: NEGATIVE
BLOOD URINE: ABNORMAL
COLOR: YELLOW
GLUCOSE QUALITATIVE U: NEGATIVE MG/DL
KETONES URINE: ABNORMAL MG/DL
LEUKOCYTE ESTERASE URINE: NEGATIVE
NITRITE URINE: NEGATIVE
PH URINE: 5.5
PROTEIN URINE: NORMAL MG/DL
SPECIFIC GRAVITY URINE: >1.03
UROBILINOGEN URINE: 0.2 MG/DL

## 2024-03-30 LAB — BACTERIA UR CULT: NORMAL

## 2024-04-09 RX ORDER — TIRZEPATIDE 2.5 MG/.5ML
2.5 INJECTION, SOLUTION SUBCUTANEOUS WEEKLY
Qty: 2 | Refills: 3 | Status: ACTIVE | COMMUNITY
Start: 2024-04-09 | End: 1900-01-01

## 2024-04-09 RX ORDER — SEMAGLUTIDE 0.25 MG/.5ML
0.25 INJECTION, SOLUTION SUBCUTANEOUS
Qty: 1 | Refills: 3 | Status: DISCONTINUED | COMMUNITY
Start: 2024-02-18 | End: 2024-04-09

## 2024-04-11 ENCOUNTER — APPOINTMENT (OUTPATIENT)
Dept: UROLOGY | Facility: CLINIC | Age: 44
End: 2024-04-11

## 2024-05-02 ENCOUNTER — APPOINTMENT (OUTPATIENT)
Dept: UROLOGY | Facility: CLINIC | Age: 44
End: 2024-05-02

## 2024-10-28 DIAGNOSIS — R10.9 UNSPECIFIED ABDOMINAL PAIN: ICD-10-CM

## 2024-10-29 LAB — URINE CYTOLOGY: NORMAL

## 2024-12-17 RX ORDER — TIRZEPATIDE 2.5 MG/.5ML
2.5 INJECTION, SOLUTION SUBCUTANEOUS
Qty: 1 | Refills: 3 | Status: ACTIVE | COMMUNITY
Start: 2024-12-17 | End: 1900-01-01

## 2025-03-06 ENCOUNTER — NON-APPOINTMENT (OUTPATIENT)
Age: 45
End: 2025-03-06

## 2025-03-06 DIAGNOSIS — E66.811 OBESITY, CLASS 1: ICD-10-CM

## 2025-06-03 RX ORDER — TIRZEPATIDE 12.5 MG/.5ML
12.5 INJECTION, SOLUTION SUBCUTANEOUS
Qty: 1 | Refills: 0 | Status: ACTIVE | COMMUNITY
Start: 2025-06-03 | End: 1900-01-01

## 2025-08-25 RX ORDER — ONDANSETRON 4 MG/1
4 TABLET ORAL EVERY 6 HOURS
Qty: 30 | Refills: 2 | Status: ACTIVE | COMMUNITY
Start: 2025-08-25 | End: 1900-01-01